# Patient Record
Sex: MALE | Race: WHITE | NOT HISPANIC OR LATINO | Employment: FULL TIME | URBAN - METROPOLITAN AREA
[De-identification: names, ages, dates, MRNs, and addresses within clinical notes are randomized per-mention and may not be internally consistent; named-entity substitution may affect disease eponyms.]

---

## 2020-05-22 ENCOUNTER — LAB VISIT (OUTPATIENT)
Dept: PRIMARY CARE CLINIC | Facility: CLINIC | Age: 70
End: 2020-05-22
Payer: MEDICARE

## 2020-05-22 DIAGNOSIS — R05.9 COUGH: Primary | ICD-10-CM

## 2020-05-22 PROCEDURE — U0003 INFECTIOUS AGENT DETECTION BY NUCLEIC ACID (DNA OR RNA); SEVERE ACUTE RESPIRATORY SYNDROME CORONAVIRUS 2 (SARS-COV-2) (CORONAVIRUS DISEASE [COVID-19]), AMPLIFIED PROBE TECHNIQUE, MAKING USE OF HIGH THROUGHPUT TECHNOLOGIES AS DESCRIBED BY CMS-2020-01-R: HCPCS

## 2020-05-24 LAB — SARS-COV-2 RNA RESP QL NAA+PROBE: NOT DETECTED

## 2020-11-10 ENCOUNTER — LAB VISIT (OUTPATIENT)
Dept: PRIMARY CARE CLINIC | Facility: OTHER | Age: 70
End: 2020-11-10
Attending: INTERNAL MEDICINE
Payer: MEDICARE

## 2020-11-10 DIAGNOSIS — Z03.818 ENCOUNTER FOR OBSERVATION FOR SUSPECTED EXPOSURE TO OTHER BIOLOGICAL AGENTS RULED OUT: ICD-10-CM

## 2020-11-10 PROCEDURE — U0003 INFECTIOUS AGENT DETECTION BY NUCLEIC ACID (DNA OR RNA); SEVERE ACUTE RESPIRATORY SYNDROME CORONAVIRUS 2 (SARS-COV-2) (CORONAVIRUS DISEASE [COVID-19]), AMPLIFIED PROBE TECHNIQUE, MAKING USE OF HIGH THROUGHPUT TECHNOLOGIES AS DESCRIBED BY CMS-2020-01-R: HCPCS

## 2020-11-11 LAB — SARS-COV-2 RNA RESP QL NAA+PROBE: NOT DETECTED

## 2022-12-16 ENCOUNTER — ANESTHESIA EVENT (OUTPATIENT)
Dept: SURGERY | Facility: OTHER | Age: 72
End: 2022-12-16
Payer: MEDICARE

## 2022-12-22 ENCOUNTER — HOSPITAL ENCOUNTER (OUTPATIENT)
Dept: PREADMISSION TESTING | Facility: OTHER | Age: 72
Discharge: HOME | End: 2022-12-22
Attending: ORTHOPAEDIC SURGERY
Payer: MEDICARE

## 2022-12-22 VITALS
BODY MASS INDEX: 31.5 KG/M2 | RESPIRATION RATE: 16 BRPM | HEART RATE: 62 BPM | OXYGEN SATURATION: 98 % | DIASTOLIC BLOOD PRESSURE: 83 MMHG | TEMPERATURE: 97 F | SYSTOLIC BLOOD PRESSURE: 149 MMHG | HEIGHT: 71 IN | WEIGHT: 225 LBS

## 2022-12-22 DIAGNOSIS — Z01.818 PREOP TESTING: Primary | ICD-10-CM

## 2022-12-22 LAB
BASOPHILS # BLD AUTO: 0.04 K/UL (ref 0–0.2)
BASOPHILS NFR BLD: 0.6 % (ref 0–1.9)
DIFFERENTIAL METHOD: ABNORMAL
EOSINOPHIL # BLD AUTO: 0.3 K/UL (ref 0–0.5)
EOSINOPHIL NFR BLD: 4.4 % (ref 0–8)
ERYTHROCYTE [DISTWIDTH] IN BLOOD BY AUTOMATED COUNT: 13.2 % (ref 11.5–14.5)
HCT VFR BLD AUTO: 42.6 % (ref 40–54)
HGB BLD-MCNC: 14.1 G/DL (ref 14–18)
IMM GRANULOCYTES # BLD AUTO: 0.01 K/UL (ref 0–0.04)
IMM GRANULOCYTES NFR BLD AUTO: 0.1 % (ref 0–0.5)
LYMPHOCYTES # BLD AUTO: 2.2 K/UL (ref 1–4.8)
LYMPHOCYTES NFR BLD: 31.6 % (ref 18–48)
MCH RBC QN AUTO: 31.9 PG (ref 27–31)
MCHC RBC AUTO-ENTMCNC: 33.1 G/DL (ref 32–36)
MCV RBC AUTO: 96 FL (ref 82–98)
MONOCYTES # BLD AUTO: 0.7 K/UL (ref 0.3–1)
MONOCYTES NFR BLD: 9.5 % (ref 4–15)
NEUTROPHILS # BLD AUTO: 3.8 K/UL (ref 1.8–7.7)
NEUTROPHILS NFR BLD: 53.8 % (ref 38–73)
NRBC BLD-RTO: 0 /100 WBC
PLATELET # BLD AUTO: 220 K/UL (ref 150–450)
PMV BLD AUTO: 10.1 FL (ref 9.2–12.9)
RBC # BLD AUTO: 4.42 M/UL (ref 4.6–6.2)
WBC # BLD AUTO: 7.02 K/UL (ref 3.9–12.7)

## 2022-12-22 PROCEDURE — 93005 ELECTROCARDIOGRAM TRACING: CPT

## 2022-12-22 PROCEDURE — 36415 COLL VENOUS BLD VENIPUNCTURE: CPT | Performed by: ANESTHESIOLOGY

## 2022-12-22 PROCEDURE — 93010 ELECTROCARDIOGRAM REPORT: CPT | Mod: ,,, | Performed by: INTERNAL MEDICINE

## 2022-12-22 PROCEDURE — 93010 EKG 12-LEAD: ICD-10-PCS | Mod: ,,, | Performed by: INTERNAL MEDICINE

## 2022-12-22 PROCEDURE — 85025 COMPLETE CBC W/AUTO DIFF WBC: CPT | Performed by: ANESTHESIOLOGY

## 2022-12-22 RX ORDER — AMLODIPINE BESYLATE 2.5 MG/1
2.5 TABLET ORAL DAILY
COMMUNITY

## 2022-12-22 RX ORDER — LIDOCAINE HYDROCHLORIDE 10 MG/ML
0.5 INJECTION, SOLUTION EPIDURAL; INFILTRATION; INTRACAUDAL; PERINEURAL ONCE
Status: CANCELLED | OUTPATIENT
Start: 2022-12-22 | End: 2022-12-22

## 2022-12-22 RX ORDER — PREDNISONE 2.5 MG/1
2.5 TABLET ORAL DAILY
COMMUNITY

## 2022-12-22 RX ORDER — ROPINIROLE 0.5 MG/1
0.5 TABLET, FILM COATED ORAL 3 TIMES DAILY PRN
COMMUNITY

## 2022-12-22 RX ORDER — ATORVASTATIN CALCIUM 80 MG/1
80 TABLET, FILM COATED ORAL DAILY
COMMUNITY

## 2022-12-22 RX ORDER — ASPIRIN 81 MG/1
81 TABLET ORAL DAILY
COMMUNITY

## 2022-12-22 RX ORDER — SODIUM CHLORIDE, SODIUM LACTATE, POTASSIUM CHLORIDE, CALCIUM CHLORIDE 600; 310; 30; 20 MG/100ML; MG/100ML; MG/100ML; MG/100ML
INJECTION, SOLUTION INTRAVENOUS CONTINUOUS
Status: CANCELLED | OUTPATIENT
Start: 2022-12-22

## 2022-12-22 RX ORDER — METOPROLOL SUCCINATE 25 MG/1
25 TABLET, EXTENDED RELEASE ORAL DAILY
COMMUNITY

## 2022-12-22 RX ORDER — ALLOPURINOL 300 MG/1
300 TABLET ORAL DAILY
COMMUNITY

## 2022-12-22 RX ORDER — SERTRALINE HYDROCHLORIDE 50 MG/1
50 TABLET, FILM COATED ORAL DAILY
COMMUNITY

## 2022-12-22 NOTE — DISCHARGE INSTRUCTIONS
Information to Prepare you for your Surgery    PRE-ADMIT TESTING -  [phone]    [address] [facility]        Your surgery has been scheduled at [facility]. We are pleased to have the opportunity to serve you. For Further Information please call [phone].    On the day of surgery please report to the Information Desk on the 1st floor.    CONTACT YOUR PHYSICIAN'S OFFICE THE DAY PRIOR TO YOUR SURGERY TO OBTAIN YOUR ARRIVAL TIME.     The evening before surgery do not eat anything after 9 p.m. ( this includes hard candy, chewing gum and mints).  You may only have GATORADE, POWERADE AND WATER  from 9 p.m. until you leave your home.   DO NOT DRINK ANY LIQUIDS ON THE WAY TO THE HOSPITAL.      Why does your anesthesiologist allow you to drink Gatorade/Powerade before surgery?  Gatorade/Powerade helps to increase your comfort before surgery and to decrease your nausea after surgery.   The carbohydrates in Gatorade/Powerade help reduce your body's stress response to surgery.  If you are a diabetic-drink only water prior to surgery.    Current Visitor policy(12/27/2021) - Patients may have 2 visitors pre and post procedure. Only 2 visitors will be allowed in the Surgical building with the patient.     SPECIAL MEDICATION INSTRUCTIONS: TAKE medications checked off by the Anesthesiologist on your Medication List.    Surgery Patients:  If you take ASPIRIN - Your PHYSICIAN/SURGEON will need to inform you IF/OR when you need to stop taking aspirin prior to your surgery.     The week prior to surgery do not ot take any medications containing IBUPROFEN or NSAIDS ( Advil, Motrin, Goodys, BC, Aleve, Naproxen etc) If you are not sure if you should take a medicine please call your surgeon's office.  You may take Tylenol.    Do Not Wear any make-up (especially eye make-up) to surgery. Please remove any false eyelashes or eyelash extensions. If you arrive the day of surgery with makeup/eyelashes  on you will be required to remove prior to surgery. (There is a risk of corneal abrasions if eye makeup/eyelash extensions are not removed)    Leave all valuables at home.   Do Not wear any jewelry or watches, including any metal in body piercings. Jewelry must be removed prior to coming to the hospital.  There is a possibility that rings that are unable to be removed may be cut off if they are on the surgical extremity.    Please remove all hair extensions, wigs, clips and any other metal accessories/ ornaments from your hair.  These items may pose a flammable/fire risk in Surgery and must be removed.    Do not shave your surgical area at least 5 days prior to your surgery. The surgical prep will be performed at the hospital according to Infection Control regulations.    Contact Lens must be removed before surgery. Either do not wear the contact lens or bring a case and solution for storage.  Please bring a container for eyeglasses or dentures as required.  Bring any paperwork your physician has provided, such as consent forms,  history and physicals, doctor's orders, etc.   Bring comfortable clothes that are loose fitting to wear upon discharge. Take into consideration the type of surgery being performed.  Maintain your diet as advised per your physician the day prior to surgery.    Adequate rest the night before surgery is advised.   Park in the Parking lot behind the hospital or in the [place] [facility] across the street from the parking lot. Parking is complimentary.  If you will be discharged the same day as your procedure, please arrange for a responsible adult to drive you home or to accompany you if traveling by taxi.   YOU WILL NOT BE PERMITTED TO DRIVE OR TO LEAVE THE HOSPITAL ALONE AFTER SURGERY.   If you are being discharged the same day, it is strongly recommended that you arrange for someone to remain with you for the first 24 hrs following your surgery.    The Surgeon will speak to your  family/visitor after your surgery regarding the outcome of your surgery and post op care.  The Surgeon may speak to you after your surgery, but there is a possibility you may not remember the details.  Please check with your family members regarding the conversation with the Surgeon.    We strongly recommend whoever is bringing you home be present for discharge instructions.  This will ensure a thorough understanding for your post op home care.    ALL CHILDREN MUST ALWAYS BE ACCOMPANIED BY AN ADULT.    Visitors-Refer to current Visitor policy handouts.    Thank you for your cooperation.  The Staff of [facility].            Bathing Instructions with Hibiclens  Shower the evening before and morning of your procedure with Chlorhexidine (Hibiclens)  do not use Chlorhexidine on your face or genitals. Do not get in your eyes.  Wash your face with water and your regular face wash/soap  Use your regular shampoo  Apply Chlorhexidine (Hibiclens) directly on your skin or on a wet washcloth and wash gently. When showering: Move away from the shower stream when applying Chlorhexidine (Hibiclens) to avoid rinsing off too soon.  Rinse thoroughly with warm water  Do not dilute Chlorhexidine (Hibiclens)   Dry off as usual, do not use any deodorant, powder, body lotions, perfume, after shave or cologne.

## 2022-12-22 NOTE — ANESTHESIA PREPROCEDURE EVALUATION
12/22/2022  Nagi Sharma is a 72 y.o., male.      Pre-op Assessment    I have reviewed the Patient Summary Reports.     I have reviewed the Nursing Notes.    I have reviewed the Medications.   Steroids Taken In Past Year: Prednisone    Review of Systems  Anesthesia Hx:  No problems with previous Anesthesia  History of prior surgery of interest to airway management or planning: Denies Family Hx of Anesthesia complications.   Denies Personal Hx of Anesthesia complications.   Social:  Former Smoker    Hematology/Oncology:  Hematology Normal   Oncology Normal     EENT/Dental:EENT/Dental Normal   Cardiovascular:   Hypertension CAD asymptomatic     Pulmonary:  Pulmonary Normal    Renal/:  Renal/ Normal     Hepatic/GI:  Hepatic/GI Normal    Musculoskeletal:   Arthritis  Polymyalgia Rheumatica   Neurological:  Neurology Normal    Endocrine:  Endocrine Normal    Dermatological:  Skin Normal    Psych:  Psychiatric Normal           Physical Exam  General: Cooperative, Alert, Well nourished and Oriented    Airway:  Mallampati: II   Mouth Opening: Normal  Neck ROM: Normal ROM    Dental:  Intact        Anesthesia Plan  Type of Anesthesia, risks & benefits discussed:    Anesthesia Type: Gen Supraglottic Airway  Intra-op Monitoring Plan: Standard ASA Monitors  Post Op Pain Control Plan: multimodal analgesia  Induction:  IV  Informed Consent: Informed consent signed with the Patient and all parties understand the risks and agree with anesthesia plan.  All questions answered.   ASA Score: 2    Ready For Surgery From Anesthesia Perspective.     .

## 2022-12-30 ENCOUNTER — ANESTHESIA (OUTPATIENT)
Dept: SURGERY | Facility: OTHER | Age: 72
End: 2022-12-30
Payer: MEDICARE

## 2022-12-30 ENCOUNTER — HOSPITAL ENCOUNTER (OUTPATIENT)
Facility: OTHER | Age: 72
Discharge: HOME | End: 2022-12-30
Attending: ORTHOPAEDIC SURGERY | Admitting: ORTHOPAEDIC SURGERY
Payer: MEDICARE

## 2022-12-30 DIAGNOSIS — M18.12 OSTEOARTHRITIS OF THUMB, LEFT: Primary | ICD-10-CM

## 2022-12-30 PROCEDURE — 36000709 HC OR TIME LEV III EA ADD 15 MIN: Performed by: ORTHOPAEDIC SURGERY

## 2022-12-30 PROCEDURE — 63600175 PHARM REV CODE 636 W HCPCS: Performed by: ANESTHESIOLOGY

## 2022-12-30 PROCEDURE — 71000015 HC POSTOP RECOV 1ST HR: Performed by: ORTHOPAEDIC SURGERY

## 2022-12-30 PROCEDURE — 37000009 HC ANESTHESIA EA ADD 15 MINS: Performed by: ORTHOPAEDIC SURGERY

## 2022-12-30 PROCEDURE — 76942 ECHO GUIDE FOR BIOPSY: CPT | Performed by: ANESTHESIOLOGY

## 2022-12-30 PROCEDURE — 63600175 PHARM REV CODE 636 W HCPCS: Performed by: STUDENT IN AN ORGANIZED HEALTH CARE EDUCATION/TRAINING PROGRAM

## 2022-12-30 PROCEDURE — 36000708 HC OR TIME LEV III 1ST 15 MIN: Performed by: ORTHOPAEDIC SURGERY

## 2022-12-30 PROCEDURE — 63600175 PHARM REV CODE 636 W HCPCS: Performed by: NURSE ANESTHETIST, CERTIFIED REGISTERED

## 2022-12-30 PROCEDURE — 71000016 HC POSTOP RECOV ADDL HR: Performed by: ORTHOPAEDIC SURGERY

## 2022-12-30 PROCEDURE — C1713 ANCHOR/SCREW BN/BN,TIS/BN: HCPCS | Performed by: ORTHOPAEDIC SURGERY

## 2022-12-30 PROCEDURE — 25000003 PHARM REV CODE 250: Performed by: STUDENT IN AN ORGANIZED HEALTH CARE EDUCATION/TRAINING PROGRAM

## 2022-12-30 PROCEDURE — 37000008 HC ANESTHESIA 1ST 15 MINUTES: Performed by: ORTHOPAEDIC SURGERY

## 2022-12-30 DEVICE — SYS CMC LIG RECON IMPLANT: Type: IMPLANTABLE DEVICE | Site: HAND | Status: FUNCTIONAL

## 2022-12-30 RX ORDER — PROCHLORPERAZINE EDISYLATE 5 MG/ML
5 INJECTION INTRAMUSCULAR; INTRAVENOUS EVERY 30 MIN PRN
Status: DISCONTINUED | OUTPATIENT
Start: 2022-12-30 | End: 2022-12-30 | Stop reason: HOSPADM

## 2022-12-30 RX ORDER — OXYCODONE HYDROCHLORIDE 5 MG/1
5 TABLET ORAL
Status: DISCONTINUED | OUTPATIENT
Start: 2022-12-30 | End: 2022-12-30 | Stop reason: HOSPADM

## 2022-12-30 RX ORDER — LIDOCAINE HYDROCHLORIDE 20 MG/ML
INJECTION INTRAVENOUS
Status: DISCONTINUED | OUTPATIENT
Start: 2022-12-30 | End: 2022-12-30

## 2022-12-30 RX ORDER — MIDAZOLAM HYDROCHLORIDE 1 MG/ML
INJECTION INTRAMUSCULAR; INTRAVENOUS
Status: DISCONTINUED | OUTPATIENT
Start: 2022-12-30 | End: 2022-12-30

## 2022-12-30 RX ORDER — FENTANYL CITRATE 50 UG/ML
INJECTION, SOLUTION INTRAMUSCULAR; INTRAVENOUS
Status: DISCONTINUED | OUTPATIENT
Start: 2022-12-30 | End: 2022-12-30

## 2022-12-30 RX ORDER — CEFAZOLIN SODIUM 1 G/3ML
INJECTION, POWDER, FOR SOLUTION INTRAMUSCULAR; INTRAVENOUS
Status: DISCONTINUED | OUTPATIENT
Start: 2022-12-30 | End: 2022-12-30

## 2022-12-30 RX ORDER — CEFAZOLIN SODIUM 1 G/3ML
2 INJECTION, POWDER, FOR SOLUTION INTRAMUSCULAR; INTRAVENOUS
Status: CANCELLED | OUTPATIENT
Start: 2022-12-30 | End: 2022-12-30

## 2022-12-30 RX ORDER — PROPOFOL 10 MG/ML
VIAL (ML) INTRAVENOUS
Status: DISCONTINUED | OUTPATIENT
Start: 2022-12-30 | End: 2022-12-30

## 2022-12-30 RX ORDER — DIPHENHYDRAMINE HYDROCHLORIDE 50 MG/ML
25 INJECTION INTRAMUSCULAR; INTRAVENOUS EVERY 6 HOURS PRN
Status: DISCONTINUED | OUTPATIENT
Start: 2022-12-30 | End: 2022-12-30 | Stop reason: HOSPADM

## 2022-12-30 RX ORDER — PROPOFOL 10 MG/ML
VIAL (ML) INTRAVENOUS CONTINUOUS PRN
Status: DISCONTINUED | OUTPATIENT
Start: 2022-12-30 | End: 2022-12-30

## 2022-12-30 RX ORDER — ROPIVACAINE HYDROCHLORIDE 5 MG/ML
INJECTION, SOLUTION EPIDURAL; INFILTRATION; PERINEURAL
Status: COMPLETED | OUTPATIENT
Start: 2022-12-30 | End: 2022-12-30

## 2022-12-30 RX ORDER — SODIUM CHLORIDE 0.9 % (FLUSH) 0.9 %
3 SYRINGE (ML) INJECTION
Status: DISCONTINUED | OUTPATIENT
Start: 2022-12-30 | End: 2022-12-30 | Stop reason: HOSPADM

## 2022-12-30 RX ORDER — LIDOCAINE HYDROCHLORIDE 10 MG/ML
0.5 INJECTION, SOLUTION EPIDURAL; INFILTRATION; INTRACAUDAL; PERINEURAL ONCE
Status: DISCONTINUED | OUTPATIENT
Start: 2022-12-30 | End: 2022-12-30 | Stop reason: HOSPADM

## 2022-12-30 RX ORDER — SODIUM CHLORIDE, SODIUM LACTATE, POTASSIUM CHLORIDE, CALCIUM CHLORIDE 600; 310; 30; 20 MG/100ML; MG/100ML; MG/100ML; MG/100ML
INJECTION, SOLUTION INTRAVENOUS CONTINUOUS
Status: DISCONTINUED | OUTPATIENT
Start: 2022-12-30 | End: 2022-12-30 | Stop reason: HOSPADM

## 2022-12-30 RX ORDER — EPHEDRINE SULFATE 50 MG/ML
INJECTION, SOLUTION INTRAVENOUS
Status: DISCONTINUED | OUTPATIENT
Start: 2022-12-30 | End: 2022-12-30

## 2022-12-30 RX ORDER — MEPERIDINE HYDROCHLORIDE 25 MG/ML
12.5 INJECTION INTRAMUSCULAR; INTRAVENOUS; SUBCUTANEOUS ONCE AS NEEDED
Status: DISCONTINUED | OUTPATIENT
Start: 2022-12-30 | End: 2022-12-30 | Stop reason: HOSPADM

## 2022-12-30 RX ORDER — HYDROCODONE BITARTRATE AND ACETAMINOPHEN 5; 325 MG/1; MG/1
1 TABLET ORAL EVERY 4 HOURS PRN
Qty: 30 TABLET | Refills: 0 | Status: SHIPPED | OUTPATIENT
Start: 2022-12-30

## 2022-12-30 RX ORDER — HYDROMORPHONE HYDROCHLORIDE 2 MG/ML
0.4 INJECTION, SOLUTION INTRAMUSCULAR; INTRAVENOUS; SUBCUTANEOUS EVERY 5 MIN PRN
Status: DISCONTINUED | OUTPATIENT
Start: 2022-12-30 | End: 2022-12-30 | Stop reason: HOSPADM

## 2022-12-30 RX ADMIN — MIDAZOLAM HYDROCHLORIDE 1 MG: 1 INJECTION, SOLUTION INTRAMUSCULAR; INTRAVENOUS at 06:12

## 2022-12-30 RX ADMIN — SODIUM CHLORIDE, SODIUM LACTATE, POTASSIUM CHLORIDE, AND CALCIUM CHLORIDE: .6; .31; .03; .02 INJECTION, SOLUTION INTRAVENOUS at 06:12

## 2022-12-30 RX ADMIN — GLYCOPYRROLATE 0.2 MG: 0.2 INJECTION, SOLUTION INTRAMUSCULAR; INTRAVITREAL at 06:12

## 2022-12-30 RX ADMIN — CEFAZOLIN 2 G: 330 INJECTION, POWDER, FOR SOLUTION INTRAMUSCULAR; INTRAVENOUS at 07:12

## 2022-12-30 RX ADMIN — ROPIVACAINE HYDROCHLORIDE 20 ML: 5 INJECTION, SOLUTION EPIDURAL; INFILTRATION; PERINEURAL at 06:12

## 2022-12-30 RX ADMIN — PROPOFOL 70 MG: 10 INJECTION, EMULSION INTRAVENOUS at 07:12

## 2022-12-30 RX ADMIN — EPHEDRINE SULFATE 5 MG: 50 INJECTION INTRAVENOUS at 08:12

## 2022-12-30 RX ADMIN — PROPOFOL 125 MCG/KG/MIN: 10 INJECTION, EMULSION INTRAVENOUS at 07:12

## 2022-12-30 RX ADMIN — EPHEDRINE SULFATE 5 MG: 50 INJECTION INTRAVENOUS at 07:12

## 2022-12-30 RX ADMIN — FENTANYL CITRATE 50 MCG: 50 INJECTION, SOLUTION INTRAMUSCULAR; INTRAVENOUS at 06:12

## 2022-12-30 RX ADMIN — LIDOCAINE HYDROCHLORIDE 60 MG: 20 INJECTION, SOLUTION INTRAVENOUS at 07:12

## 2022-12-30 NOTE — ANESTHESIA POSTPROCEDURE EVALUATION
Anesthesia Post Evaluation    Patient: Nagi Sharma    Procedure(s) Performed: Procedure(s) (LRB):  INTERPOSITION ARTHROPLASTY, CMC JOINT / CARPOMETACARPAL ( CMC) (Left)    Final Anesthesia Type: regional      Patient location during evaluation: [facility]  Patient participation: Yes- Able to Participate  Level of consciousness: awake and alert  Post-procedure vital signs: reviewed and stable  Pain management: adequate  Airway patency: patent  TALI mitigation strategies: Multimodal analgesia  PONV status at discharge: No PONV  Anesthetic complications: no      Cardiovascular status: blood pressure returned to baseline, hemodynamically stable and stable  Respiratory status: unassisted, spontaneous ventilation and room air  Hydration status: euvolemic  Follow-up not needed.          Vitals Value Taken Time   BP  12/30/22 0819   Temp  12/30/22 0819   Pulse  12/30/22 0819   Resp  12/30/22 0819   SpO2  12/30/22 0819         No case tracking events are documented in the log.      Pain/Mariann Score: No data recorded

## 2022-12-30 NOTE — OR NURSING
Pt reported he last took his aspirin 81 mg on 12/29/22 at 0500. Dr Mar notified and Dr Kothari notified. No new orders and no changes to plan of care for surgery this morning.

## 2022-12-30 NOTE — DISCHARGE INSTRUCTIONS
Anesthesia: Monitored Anesthesia Care (MAC)    Anesthesia Safety  Have an adult family member or friend drive you home after the procedure.  For the first 24 hours after your surgery:  Do not drive or use heavy equipment.  Do not make important decisions or sign documents.  Avoid alcohol.  Have someone stay with you, if possible. They can watch for problems and help keep you safe.         FOLLOW ANY INSTRUCTIONS GIVEN BY DR CHAPMAN

## 2022-12-30 NOTE — ANESTHESIA PROCEDURE NOTES
Peripheral Block    Patient location during procedure: holding area    Reason for block: primary anesthetic    Diagnosis: wrist surgery   Timeout: 12/30/2022 6:48 AM     Staffing  Authorizing Provider: Octavio Aceves MD  Performing Provider: Octavio Aceves MD    Preanesthetic Checklist  Completed: patient identified, IV checked, site marked, risks and benefits discussed, surgical consent, monitors and equipment checked, pre-op evaluation and timeout performed  Peripheral Block  Patient position: supine  Prep: ChloraPrep  Patient monitoring: heart rate, cardiac monitor, continuous pulse ox and frequent blood pressure checks  Block type: supraclavicular  Laterality: left  Injection technique: single shot  Needle  Needle type: Stimuplex   Needle gauge: 20 G  Needle length: 4 in  Needle localization: nerve stimulator and ultrasound guidance   -ultrasound image captured on disc.  Assessment  Injection assessment: negative parasthesia, negative aspiration and local visualized surrounding nerve  Paresthesia pain: none  Heart rate change: no  Slow fractionated injection: yes  Pain Tolerance: comfortable throughout block and no complaints  Medications:    Medications: ropivacaine (NAROPIN) injection 0.5% - Perineural   20 mL - 12/30/2022 6:52:00 AM

## 2022-12-30 NOTE — PLAN OF CARE
Nagi Sharma has met all discharge criteria from Phase II. Vital Signs are stable, ambulating  without difficulty. Discharge instructions given, patient verbalized understanding. Discharged from facility via wheelchair in stable condition.

## 2022-12-30 NOTE — BRIEF OP NOTE
[facility] ([facility]   Operative Note     SUMMARY     Surgery Date: 12/30/2022     Surgeon(s) and Role:     * Claude S. Williams IV, MD - Primary    Assisting Surgeon: None    Pre-op Diagnosis:  Osteoarthritis of thumb, left [M18.12]    Post-op Diagnosis:  Post-Op Diagnosis Codes:     * Osteoarthritis of thumb, left [M18.12]    Procedure(s) (LRB):  INTERPOSITION ARTHROPLASTY, CMC JOINT / CARPOMETACARPAL ( CMC) (Left)    Anesthesia: General    Description of the findings of the procedure: as above    Findings/Key Components: as above    Surgery Date: 12/30/2022  Patient Name: Nagi Sharma  CSN: [id number]  Surgeon(s) and Role:    Claude S. Williams IV, MD - Primary    Pre-op Diagnosis:  Osteoarthritis of thumb, left [M18.12]    Post-op Diagnosis:  Osteoarthritis of thumb, left [M18.12]    Procedure(s) (LRB):  INTERPOSITION ARTHROPLASTY, CMC JOINT / CARPOMETACARPAL ( CMC) (Left)        Procedure in detail:  After proper informed consent was obtained and questions were answered the operative site was marked and the patient was transported to the operating suite and placed supine on the operating table.  General endotracheal anesthesia was administered per the anesthesiologist without difficulty.  All bony prominences were well padded and the upper extremity was placed into a well-padded upper arm tourniquet and onto a hand table. The upper extremity was then thoroughly prepped with alcohol ChloraPrep and draped in usual sterile fashion.  Preoperative antibiotics were administered.  Routine preoperative time-out was then taken and the operative site was positively identified by the operative team.  After Esmarch examination the upper extremity tourniquet was elevated to 250 mm of mercury.  A longitudinal incision was then made over the base of the thumb and careful dissection was carried down through the subdermal tissue using bipolar cautery for hemostasis.  Care was taken to identify and protect the cutaneous  nerve branches which were carefully retracted during the procedure. The extensor tendons were retracted and a longitudinal incision was made in the trapezial metacarpal joint capsule.  The capsular flaps were elevated from the trapezium and subperiosteal dissection of the trapezium was carried out. The trapezium was divided and then removed in piecemeal fashion. Care was taken to protect the vascular structures and flexor carpi radialis distal insertion.  The image intensifier was then used to visualize complete excision of the trapezium.  An incision was then made in the forearm and the flexor carpi radialis tendon was dissected and divided and then withdrawn into the distal wound. The FCR was traced to its distal insertion.  A guide pin was then advanced from the base of the 1st metacarpal radial cortex retrograde to the articular surface. A 4 mm cannulated drill was then advanced over the guidewire.  The flexor carpi radialis graft was then passed through the base of the thumb metacarpal and with the metacarpal appropriately positioned and absorbable interference screw was advanced in the bone tunnel to secure the FCR graft.  The remainder of the graft was then placed within the joint space as an anchovy spacer.  The tourniquet was deflated and meticulous hemostasis was confirmed. The capsule was then closed with figure-of-eight interrupted Vicryl suture. The image intensifier again was used to visualize complete excision of the trapezium and appropriate position of the thumb metacarpal, stability and placement of the bone tunnel.  The skin incisions were then closed with Monocryl and nylon skin suture. There was normal circulation throughout the hand after deflation of the tourniquet. A sterile soft dressing was applied and a thumb spica splint was placed.  The patient was awakened in the operating suite and taken to the recovery area in stable condition. She tolerated the procedure very well. Catie gracia and  needle counts were correct x2.  Complications none.     Estimated Blood Loss: * No values recorded between 12/30/2022  7:20 AM and 12/30/2022  8:19 AM *         Specimens:   Specimen (24h ago, onward)      None            Discharge Note    SUMMARY     Admit Date: 12/30/2022    Discharge Date and Time:  12/30/2022 8:29 AM    Hospital Course (synopsis of major diagnoses, care, treatment, and services provided during the course of the hospital stay): [email or web address]     Final Diagnosis: Post-Op Diagnosis Codes:     * Osteoarthritis of thumb, left [M18.12]    Disposition: Home or Self Care    Follow Up/Patient Instructions:     Medications:  Reconciled Home Medications:      Medication List        START taking these medications      HYDROcodone-acetaminophen 5-325 mg per tablet  Commonly known as: NORCO  Take 1 tablet by mouth every 4 (four) hours as needed for Pain.            CONTINUE taking these medications      allopurinoL 300 MG tablet  Commonly known as: ZYLOPRIM  Take 300 mg by mouth once daily.     amLODIPine 2.5 MG tablet  Commonly known as: NORVASC  Take 2.5 mg by mouth once daily.     aspirin 81 MG EC tablet  Commonly known as: ECOTRIN  Take 81 mg by mouth once daily.     atorvastatin 80 MG tablet  Commonly known as: LIPITOR  Take 80 mg by mouth once daily.     COLCHICINE ORAL  Take by mouth as needed.     ezetimibe 10 mg tablet  Commonly known as: ZETIA  TAKE 1 TABLET EVERY DAY     metoprolol succinate 25 MG 24 hr tablet  Commonly known as: TOPROL-XL  Take 25 mg by mouth once daily.     predniSONE 2.5 MG tablet  Commonly known as: DELTASONE  Take 2.5 mg by mouth once daily.     rOPINIRole 0.5 MG tablet  Commonly known as: REQUIP  Take 0.5 mg by mouth 3 (three) times daily as needed.     sertraline 50 MG tablet  Commonly known as: ZOLOFT  Take 50 mg by mouth once daily.            Discharge Procedure Orders   Diet general     Leave dressing on - Keep it clean, dry, and intact until clinic visit     Call MD for:   temperature >100.4     Call MD for:  persistent nausea and vomiting     Call MD for:  severe uncontrolled pain     Call MD for:  difficulty breathing, headache or visual disturbances     Call MD for:  redness, tenderness, or signs of infection (pain, swelling, redness, odor or green/yellow discharge around incision site)     Call MD for:  hives     Call MD for:  persistent dizziness or light-headedness     Call MD for:  extreme fatigue     Keep surgical extremity elevated     Lifting restrictions     No driving, operating heavy equipment or signing legal documents while taking pain medication      Follow-up Information       Claude S. Williams Iv, MD Follow up in 1 week(s).    Specialty: Orthopedic Surgery  Why: For wound re-check, For suture removal  Contact information:  [address]  [facility] [address] [phone]

## 2023-01-02 VITALS
HEART RATE: 61 BPM | RESPIRATION RATE: 16 BRPM | BODY MASS INDEX: 31.5 KG/M2 | TEMPERATURE: 98 F | HEIGHT: 71 IN | WEIGHT: 225 LBS | SYSTOLIC BLOOD PRESSURE: 120 MMHG | DIASTOLIC BLOOD PRESSURE: 71 MMHG | OXYGEN SATURATION: 96 %

## 2023-03-09 ENCOUNTER — ANESTHESIA EVENT (OUTPATIENT)
Dept: SURGERY | Facility: OTHER | Age: 73
End: 2023-03-09
Payer: MEDICARE

## 2023-03-15 RX ORDER — CELECOXIB 200 MG/1
200 CAPSULE ORAL
COMMUNITY

## 2023-03-15 NOTE — PRE-PROCEDURE INSTRUCTIONS
Pre admit phone call completed.    Instructions given to patient about NPO status as follows:     The evening before surgery do not eat anything after 9 p.m. ( this includes hard candy, chewing gum and mints).  You may only have GATORADE, POWERADE AND WATER from 9 p.m. until you leave your home. DO NOT  DRINK ANY LIQUIDS ON THE WAY TO THE HOSPITAL.      Patient was also instructed on the below information:    Park in the Parking lot behind the hospital or in the [employer] [facility] across the street from the parking lot.  Parking is complimentary.  If you will be discharged the same day as your procedure, please arrange for a responsible adult to drive you home or  to accompany you if traveling by taxi.  YOU WILL NOT BE PERMITTED TO DRIVE OR TO LEAVE THE HOSPITAL ALONE AFTER SURGERY.  It is strongly recommended that you arrange for someone to remain with you for the first 24 hrs following your surgery.    Patient verbalized understanding of above instructions.   
none

## 2023-03-17 ENCOUNTER — ANESTHESIA (OUTPATIENT)
Dept: SURGERY | Facility: OTHER | Age: 73
End: 2023-03-17
Payer: MEDICARE

## 2023-03-17 ENCOUNTER — HOSPITAL ENCOUNTER (OUTPATIENT)
Facility: OTHER | Age: 73
Discharge: HOME | End: 2023-03-17
Attending: ORTHOPAEDIC SURGERY | Admitting: ORTHOPAEDIC SURGERY
Payer: MEDICARE

## 2023-03-17 DIAGNOSIS — M75.121 COMPLETE ROTATOR CUFF TEAR OR RUPTURE OF RIGHT SHOULDER, NOT SPECIFIED AS TRAUMATIC: ICD-10-CM

## 2023-03-17 DIAGNOSIS — M75.121 COMPLETE TEAR OF RIGHT ROTATOR CUFF, UNSPECIFIED WHETHER TRAUMATIC: Primary | ICD-10-CM

## 2023-03-17 PROCEDURE — 71000033 HC RECOVERY, INTIAL HOUR: Performed by: ORTHOPAEDIC SURGERY

## 2023-03-17 PROCEDURE — C1713 ANCHOR/SCREW BN/BN,TIS/BN: HCPCS | Performed by: ORTHOPAEDIC SURGERY

## 2023-03-17 PROCEDURE — 36000710: Performed by: ORTHOPAEDIC SURGERY

## 2023-03-17 PROCEDURE — 64415 NJX AA&/STRD BRCH PLXS IMG: CPT | Mod: 59,RT | Performed by: ANESTHESIOLOGY

## 2023-03-17 PROCEDURE — 63600175 PHARM REV CODE 636 W HCPCS: Performed by: NURSE ANESTHETIST, CERTIFIED REGISTERED

## 2023-03-17 PROCEDURE — 63600175 PHARM REV CODE 636 W HCPCS: Performed by: ORTHOPAEDIC SURGERY

## 2023-03-17 PROCEDURE — 71000015 HC POSTOP RECOV 1ST HR: Performed by: ORTHOPAEDIC SURGERY

## 2023-03-17 PROCEDURE — 25000003 PHARM REV CODE 250: Performed by: NURSE ANESTHETIST, CERTIFIED REGISTERED

## 2023-03-17 PROCEDURE — 71000039 HC RECOVERY, EACH ADD'L HOUR: Performed by: ORTHOPAEDIC SURGERY

## 2023-03-17 PROCEDURE — 37000009 HC ANESTHESIA EA ADD 15 MINS: Performed by: ORTHOPAEDIC SURGERY

## 2023-03-17 PROCEDURE — 27201423 OPTIME MED/SURG SUP & DEVICES STERILE SUPPLY: Performed by: ORTHOPAEDIC SURGERY

## 2023-03-17 PROCEDURE — 37000008 HC ANESTHESIA 1ST 15 MINUTES: Performed by: ORTHOPAEDIC SURGERY

## 2023-03-17 PROCEDURE — 63600175 PHARM REV CODE 636 W HCPCS: Performed by: ANESTHESIOLOGY

## 2023-03-17 PROCEDURE — 25000003 PHARM REV CODE 250: Performed by: ORTHOPAEDIC SURGERY

## 2023-03-17 PROCEDURE — 63600175 PHARM REV CODE 636 W HCPCS

## 2023-03-17 PROCEDURE — 71000016 HC POSTOP RECOV ADDL HR: Performed by: ORTHOPAEDIC SURGERY

## 2023-03-17 PROCEDURE — 36000711: Performed by: ORTHOPAEDIC SURGERY

## 2023-03-17 DEVICE — ANCHOR SUTURE POPLOK 4.5MM: Type: IMPLANTABLE DEVICE | Site: ARM | Status: FUNCTIONAL

## 2023-03-17 DEVICE — ANCHOR Y-KNOT PRO RC 2 2.8MM: Type: IMPLANTABLE DEVICE | Site: ARM | Status: FUNCTIONAL

## 2023-03-17 RX ORDER — LIDOCAINE HCL/PF 100 MG/5ML
SYRINGE (ML) INTRAVENOUS
Status: DISCONTINUED | OUTPATIENT
Start: 2023-03-17 | End: 2023-03-17

## 2023-03-17 RX ORDER — DIPHENHYDRAMINE HYDROCHLORIDE 50 MG/ML
12.5 INJECTION INTRAMUSCULAR; INTRAVENOUS EVERY 30 MIN PRN
Status: DISCONTINUED | OUTPATIENT
Start: 2023-03-17 | End: 2023-03-17 | Stop reason: HOSPADM

## 2023-03-17 RX ORDER — MIDAZOLAM HYDROCHLORIDE 1 MG/ML
INJECTION, SOLUTION INTRAMUSCULAR; INTRAVENOUS
Status: DISCONTINUED | OUTPATIENT
Start: 2023-03-17 | End: 2023-03-17

## 2023-03-17 RX ORDER — LIDOCAINE HYDROCHLORIDE 10 MG/ML
INJECTION, SOLUTION EPIDURAL; INFILTRATION; INTRACAUDAL; PERINEURAL
Status: DISCONTINUED | OUTPATIENT
Start: 2023-03-17 | End: 2023-03-17 | Stop reason: HOSPADM

## 2023-03-17 RX ORDER — DEXAMETHASONE SODIUM PHOSPHATE 4 MG/ML
INJECTION, SOLUTION INTRA-ARTICULAR; INTRALESIONAL; INTRAMUSCULAR; INTRAVENOUS; SOFT TISSUE
Status: DISCONTINUED | OUTPATIENT
Start: 2023-03-17 | End: 2023-03-17

## 2023-03-17 RX ORDER — ROPIVACAINE HYDROCHLORIDE 5 MG/ML
INJECTION, SOLUTION EPIDURAL; INFILTRATION; PERINEURAL
Status: COMPLETED | OUTPATIENT
Start: 2023-03-17 | End: 2023-03-17

## 2023-03-17 RX ORDER — OXYCODONE HYDROCHLORIDE 5 MG/1
5 TABLET ORAL
Status: DISCONTINUED | OUTPATIENT
Start: 2023-03-17 | End: 2023-03-17 | Stop reason: HOSPADM

## 2023-03-17 RX ORDER — SODIUM CHLORIDE 0.9 % (FLUSH) 0.9 %
3 SYRINGE (ML) INJECTION
Status: DISCONTINUED | OUTPATIENT
Start: 2023-03-17 | End: 2023-03-17 | Stop reason: HOSPADM

## 2023-03-17 RX ORDER — CEFAZOLIN SODIUM 2 G/50ML
2 SOLUTION INTRAVENOUS
Status: DISCONTINUED | OUTPATIENT
Start: 2023-03-17 | End: 2023-03-17 | Stop reason: SDUPTHER

## 2023-03-17 RX ORDER — OXYCODONE AND ACETAMINOPHEN 5; 325 MG/1; MG/1
1 TABLET ORAL EVERY 6 HOURS PRN
Qty: 30 TABLET | Refills: 0 | Status: SHIPPED | OUTPATIENT
Start: 2023-03-17

## 2023-03-17 RX ORDER — PROPOFOL 10 MG/ML
VIAL (ML) INTRAVENOUS
Status: DISCONTINUED | OUTPATIENT
Start: 2023-03-17 | End: 2023-03-17

## 2023-03-17 RX ORDER — MEPERIDINE HYDROCHLORIDE 25 MG/ML
12.5 INJECTION INTRAMUSCULAR; INTRAVENOUS; SUBCUTANEOUS ONCE AS NEEDED
Status: DISCONTINUED | OUTPATIENT
Start: 2023-03-17 | End: 2023-03-17 | Stop reason: HOSPADM

## 2023-03-17 RX ORDER — ONDANSETRON HYDROCHLORIDE 2 MG/ML
INJECTION, SOLUTION INTRAMUSCULAR; INTRAVENOUS
Status: DISCONTINUED | OUTPATIENT
Start: 2023-03-17 | End: 2023-03-17

## 2023-03-17 RX ORDER — ROCURONIUM BROMIDE 10 MG/ML
INJECTION, SOLUTION INTRAVENOUS
Status: DISCONTINUED | OUTPATIENT
Start: 2023-03-17 | End: 2023-03-17

## 2023-03-17 RX ORDER — MUPIROCIN 20 MG/G
OINTMENT TOPICAL 2 TIMES DAILY
Status: DISCONTINUED | OUTPATIENT
Start: 2023-03-17 | End: 2023-03-17 | Stop reason: HOSPADM

## 2023-03-17 RX ORDER — CEFAZOLIN SODIUM 1 G/3ML
2 INJECTION, POWDER, FOR SOLUTION INTRAMUSCULAR; INTRAVENOUS
Status: COMPLETED | OUTPATIENT
Start: 2023-03-17 | End: 2023-03-17

## 2023-03-17 RX ORDER — HYDROMORPHONE HYDROCHLORIDE 2 MG/ML
0.4 INJECTION, SOLUTION INTRAMUSCULAR; INTRAVENOUS; SUBCUTANEOUS EVERY 5 MIN PRN
Status: DISCONTINUED | OUTPATIENT
Start: 2023-03-17 | End: 2023-03-17 | Stop reason: HOSPADM

## 2023-03-17 RX ORDER — FENTANYL CITRATE 50 UG/ML
INJECTION, SOLUTION INTRAMUSCULAR; INTRAVENOUS
Status: DISCONTINUED | OUTPATIENT
Start: 2023-03-17 | End: 2023-03-17

## 2023-03-17 RX ORDER — PROCHLORPERAZINE EDISYLATE 5 MG/ML
5 INJECTION INTRAMUSCULAR; INTRAVENOUS EVERY 30 MIN PRN
Status: DISCONTINUED | OUTPATIENT
Start: 2023-03-17 | End: 2023-03-17 | Stop reason: HOSPADM

## 2023-03-17 RX ADMIN — GLYCOPYRROLATE 0.2 MG: 0.2 INJECTION, SOLUTION INTRAMUSCULAR; INTRAVITREAL at 07:03

## 2023-03-17 RX ADMIN — ONDANSETRON 4 MG: 2 INJECTION INTRAMUSCULAR; INTRAVENOUS at 07:03

## 2023-03-17 RX ADMIN — FENTANYL CITRATE 100 MCG: 50 INJECTION, SOLUTION INTRAMUSCULAR; INTRAVENOUS at 06:03

## 2023-03-17 RX ADMIN — MIDAZOLAM HYDROCHLORIDE 2 MG: 1 INJECTION, SOLUTION INTRAMUSCULAR; INTRAVENOUS at 06:03

## 2023-03-17 RX ADMIN — CEFAZOLIN 2 G: 330 INJECTION, POWDER, FOR SOLUTION INTRAMUSCULAR; INTRAVENOUS at 07:03

## 2023-03-17 RX ADMIN — LIDOCAINE HYDROCHLORIDE 75 MG: 20 INJECTION, SOLUTION INTRAVENOUS at 07:03

## 2023-03-17 RX ADMIN — ROPIVACAINE HYDROCHLORIDE 30 ML: 5 INJECTION, SOLUTION EPIDURAL; INFILTRATION; PERINEURAL at 06:03

## 2023-03-17 RX ADMIN — SODIUM CHLORIDE, SODIUM LACTATE, POTASSIUM CHLORIDE, AND CALCIUM CHLORIDE: .6; .31; .03; .02 INJECTION, SOLUTION INTRAVENOUS at 06:03

## 2023-03-17 RX ADMIN — SODIUM CHLORIDE, SODIUM LACTATE, POTASSIUM CHLORIDE, AND CALCIUM CHLORIDE: .6; .31; .03; .02 INJECTION, SOLUTION INTRAVENOUS at 08:03

## 2023-03-17 RX ADMIN — CARBOXYMETHYLCELLULOSE SODIUM 2 DROP: 2.5 SOLUTION/ DROPS OPHTHALMIC at 07:03

## 2023-03-17 RX ADMIN — PROPOFOL 150 MG: 10 INJECTION, EMULSION INTRAVENOUS at 07:03

## 2023-03-17 RX ADMIN — FENTANYL CITRATE 100 MCG: 50 INJECTION, SOLUTION INTRAMUSCULAR; INTRAVENOUS at 07:03

## 2023-03-17 RX ADMIN — DEXAMETHASONE SODIUM PHOSPHATE 8 MG: 4 INJECTION, SOLUTION INTRAMUSCULAR; INTRAVENOUS at 07:03

## 2023-03-17 RX ADMIN — PHENYLEPHRINE HYDROCHLORIDE 0.25 MCG/KG/MIN: 10 INJECTION INTRAVENOUS at 07:03

## 2023-03-17 RX ADMIN — GLYCOPYRROLATE 0.1 MG: 0.2 INJECTION, SOLUTION INTRAMUSCULAR; INTRAVITREAL at 07:03

## 2023-03-17 RX ADMIN — ROCURONIUM BROMIDE 50 MG: 10 INJECTION, SOLUTION INTRAVENOUS at 07:03

## 2023-03-17 RX ADMIN — GLYCOPYRROLATE 0.1 MG: 0.2 INJECTION, SOLUTION INTRAMUSCULAR; INTRAVITREAL at 08:03

## 2023-03-17 NOTE — OR NURSING
Notified Dr. Mar that pt has around 500 mL of urine in his bladder. Stated to in and out cath pt and give pt option to stay until able to urinate or discharge to home and come back to ER if unable to urinate

## 2023-03-17 NOTE — DISCHARGE INSTRUCTIONS

## 2023-03-17 NOTE — ANESTHESIA POSTPROCEDURE EVALUATION
Anesthesia Post Evaluation    Patient: Nagi Sharma    Procedure(s) Performed: Procedure(s) (LRB):  ARTHROSCOPY, SHOULDER (Right)  REPAIR, ROTATOR CUFF (Right)  RELEASE, TRIGGER FINGER (Right)    Final Anesthesia Type: general      Patient location during evaluation: PACU  Patient participation: Yes- Able to Participate  Level of consciousness: awake and alert  Post-procedure vital signs: reviewed and stable  Pain management: adequate  Airway patency: patent    PONV status at discharge: No PONV  Anesthetic complications: no      Cardiovascular status: blood pressure returned to baseline  Respiratory status: spontaneous ventilation  Hydration status: euvolemic  Follow-up not needed.          Vitals Value Taken Time   /78 03/17/23 1045   Temp 36.7 °C (98 °F) 03/17/23 1015   Pulse 68 03/17/23 1045   Resp 15 03/17/23 1045   SpO2 96 % 03/17/23 1045         Event Time   Out of Recovery 10:07:10         Pain/Mariann Score: Mariann Score: 10 (3/17/2023 10:45 AM)

## 2023-03-17 NOTE — ANESTHESIA PROCEDURE NOTES
Intubation    Date/Time: 3/17/2023 7:14 AM  Performed by: Sarai Coughlin CRNA  Authorized by: Rico Garcia MD     Intubation:     Induction:  Intravenous    Intubated:  Postinduction    Mask Ventilation:  Easy mask    Attempts:  1    Attempted By:  CRNA    Method of Intubation:  Video laryngoscopy    Blade:  Carvajal 3    Laryngeal View Grade: Grade I - full view of cords      Difficult Airway Encountered?: No      Complications:  None    Airway Device:  Oral endotracheal tube    Airway Device Size:  8.0    Style/Cuff Inflation:  Cuffed    Inflation Amount (mL):  5    Tube secured:  22    Secured at:  The lips    Placement Verified By:  Capnometry    Complicating Factors:  None    Findings Post-Intubation:  BS equal bilateral and atraumatic/condition of teeth unchanged

## 2023-03-17 NOTE — TRANSFER OF CARE
"Anesthesia Transfer of Care Note    Patient: Nagi Sahrma    Procedure(s) Performed: Procedure(s) (LRB):  ARTHROSCOPY, SHOULDER (Right)  REPAIR, ROTATOR CUFF (Right)  RELEASE, TRIGGER FINGER (Right)    Patient location: PACU    Anesthesia Type: general    Transport from OR: Transported from OR on 6-10 L/min O2 by face mask with adequate spontaneous ventilation    Post pain: adequate analgesia    Post assessment: no apparent anesthetic complications    Post vital signs: stable    Level of consciousness: awake    Nausea/Vomiting: no nausea/vomiting    Complications: none    Transfer of care protocol was followed      Last vitals:   Visit Vitals  BP (!) 146/72 (BP Location: Left arm, Patient Position: Lying)   Pulse 68   Temp 36.2 °C (97.1 °F) (Temporal)   Resp 16   Ht 5' 11" (1.803 m)   Wt 102.1 kg (225 lb)   SpO2 97%   BMI 31.38 kg/m²     "

## 2023-03-17 NOTE — BRIEF OP NOTE
[facility] - Surgery ([place])   Operative Note     SUMMARY     Surgery Date: 3/17/2023     Surgeon(s) and Role:     * Claude S. Williams IV, MD - Primary    Assisting Surgeon: None    Pre-op Diagnosis:  Complete tear of right rotator cuff, unspecified whether traumatic [M75.121]  Righrt small finger trigger    Post-op Diagnosis:  Post-Op Diagnosis Codes:     * Complete tear of right rotator cuff, unspecified whether traumatic [M75.121]  Right small finger trigger    Procedure(s) (LRB):  ARTHROSCOPY, SHOULDER (Right)  REPAIR, ROTATOR CUFF (Right)  RELEASE, TRIGGER FINGER (Right) Right small finger  Biceps tenotomy, acromioplasty, labral debridement.      Anesthesia: General    Description of the findings of the procedure: asabove    Findings/Key Components: as above    Estimated Blood Loss: * No values recorded between 3/17/2023  7:40 AM and 3/17/2023  8:58 AM *         Specimens:   Specimen (24h ago, onward)      None            Discharge Note    SUMMARY     Admit Date: 3/17/2023    Discharge Date and Time:  03/17/2023 9:00 AM    Hospital Course (synopsis of major diagnoses, care, treatment, and services provided during the course of the hospital stay): uneventful     Final Diagnosis: Post-Op Diagnosis Codes:     * Complete tear of right rotator cuff, unspecified whether traumatic [M75.121]    Disposition: Home or Self Care    Follow Up/Patient Instructions:     Medications:  Reconciled Home Medications:      Medication List        START taking these medications      oxyCODONE-acetaminophen 5-325 mg per tablet  Commonly known as: PERCOCET  Take 1 tablet by mouth every 6 (six) hours as needed for Pain.            CONTINUE taking these medications      allopurinoL 300 MG tablet  Commonly known as: ZYLOPRIM  Take 300 mg by mouth once daily.     amLODIPine 2.5 MG tablet  Commonly known as: NORVASC  Take 2.5 mg by mouth once daily.     aspirin 81 MG EC tablet  Commonly known as: ECOTRIN  Take 81 mg by mouth once daily.      atorvastatin 80 MG tablet  Commonly known as: LIPITOR  Take 80 mg by mouth once daily.     celecoxib 200 MG capsule  Commonly known as: CeleBREX  Take 200 mg by mouth.     COLCHICINE ORAL  Take by mouth as needed.     ezetimibe 10 mg tablet  Commonly known as: ZETIA  TAKE 1 TABLET EVERY DAY     HYDROcodone-acetaminophen 5-325 mg per tablet  Commonly known as: NORCO  Take 1 tablet by mouth every 4 (four) hours as needed for Pain.     metoprolol succinate 25 MG 24 hr tablet  Commonly known as: TOPROL-XL  Take 25 mg by mouth once daily.     rOPINIRole 0.5 MG tablet  Commonly known as: REQUIP  Take 0.5 mg by mouth 3 (three) times daily as needed.     sertraline 50 MG tablet  Commonly known as: ZOLOFT  Take 50 mg by mouth once daily.            ASK your doctor about these medications      predniSONE 2.5 MG tablet  Commonly known as: DELTASONE  Take 2.5 mg by mouth once daily.            Discharge Procedure Orders   Diet general     Leave dressing on - Keep it clean, dry, and intact until clinic visit     Call MD for:  temperature >100.4     Call MD for:  persistent nausea and vomiting     Call MD for:  severe uncontrolled pain     Call MD for:  difficulty breathing, headache or visual disturbances     Call MD for:  redness, tenderness, or signs of infection (pain, swelling, redness, odor or green/yellow discharge around incision site)     Call MD for:  hives     Call MD for:  persistent dizziness or light-headedness     Call MD for:  extreme fatigue     Ice to affected area     Lifting restrictions     No driving, operating heavy equipment or signing legal documents while taking pain medication     Sponge bath only until clinic visit      Follow-up Information       Claude S. Williams Iv, MD Follow up.    Specialty: Orthopedic Surgery  Why: For suture removal, For wound re-check  Contact information:  [address]  [facility] [id number] [phone]

## 2023-03-17 NOTE — ANESTHESIA PROCEDURE NOTES
Peripheral Block    Patient location during procedure: pre-op   Block not for primary anesthetic.  Reason for block: at surgeon's request and post-op pain management   Post-op Pain Location: Right Shoulder Pain   Start time: 3/17/2023 6:57 AM  Timeout: 3/17/2023 6:54 AM   End time: 3/17/2023 6:58 AM    Staffing  Authorizing Provider: Rico Garcia MD  Performing Provider: Rico Garcia MD    Preanesthetic Checklist  Completed: patient identified, IV checked, site marked, risks and benefits discussed, surgical consent, monitors and equipment checked, pre-op evaluation and timeout performed  Peripheral Block  Patient position: left lateral decubitus  Prep: ChloraPrep  Patient monitoring: heart rate, cardiac monitor, continuous pulse ox, continuous capnometry and frequent blood pressure checks  Block type: interscalene  Laterality: right  Injection technique: single shot  Needle  Needle type: Stimuplex   Needle gauge: 22 G  Needle length: 2 in  Needle localization: anatomical landmarks and ultrasound guidance   -ultrasound image captured on disc.  Assessment  Injection assessment: negative aspiration, negative parasthesia and local visualized surrounding nerve  Paresthesia pain: none  Heart rate change: no  Slow fractionated injection: yes  Pain Tolerance: comfortable throughout block and no complaints  Medications:    Medications: ropivacaine (NAROPIN) injection 0.5% - Perineural   30 mL - 3/17/2023 6:57:00 AM    Additional Notes  VSS.  [facility] RN monitoring vitals throughout procedure.  Patient tolerated procedure well.

## 2023-03-17 NOTE — ANESTHESIA PREPROCEDURE EVALUATION
03/17/2023  Nagi Sharma is a 72 y.o., male.      Pre-op Assessment    I have reviewed the Patient Summary Reports.     I have reviewed the Nursing Notes. I have reviewed the NPO Status.   I have reviewed the Medications.   Steroids Taken In Past Year: Prednisone    Review of Systems  Anesthesia Hx:  No problems with previous Anesthesia  History of prior surgery of interest to airway management or planning: Denies Family Hx of Anesthesia complications.   Denies Personal Hx of Anesthesia complications.   Social:  Former Smoker    Hematology/Oncology:  Hematology Normal   Oncology Normal     EENT/Dental:EENT/Dental Normal   Cardiovascular:   Hypertension CAD asymptomatic     Pulmonary:  Pulmonary Normal    Renal/:  Renal/ Normal     Hepatic/GI:  Hepatic/GI Normal    Musculoskeletal:   Arthritis  Polymyalgia Rheumatica   Neurological:  Neurology Normal    Endocrine:  Endocrine Normal    Dermatological:  Skin Normal    Psych:  Psychiatric Normal           Physical Exam  General: Cooperative, Alert, Well nourished and Oriented    Airway:  Mallampati: II   Mouth Opening: Normal  Neck ROM: Normal ROM    Dental:  Intact        Anesthesia Plan  Type of Anesthesia, risks & benefits discussed:    Anesthesia Type: Gen Supraglottic Airway  Intra-op Monitoring Plan: Standard ASA Monitors  Post Op Pain Control Plan: multimodal analgesia  Induction:  IV  Informed Consent: Informed consent signed with the Patient and all parties understand the risks and agree with anesthesia plan.  All questions answered.   ASA Score: 3  Anesthesia Plan Notes: Obseity, CAD, HTN, Polymyalgia Rheumatica    Ready For Surgery From Anesthesia Perspective.     .

## 2023-03-19 VITALS
TEMPERATURE: 98 F | HEIGHT: 71 IN | BODY MASS INDEX: 31.5 KG/M2 | WEIGHT: 225 LBS | OXYGEN SATURATION: 98 % | HEART RATE: 68 BPM | DIASTOLIC BLOOD PRESSURE: 80 MMHG | RESPIRATION RATE: 15 BRPM | SYSTOLIC BLOOD PRESSURE: 125 MMHG

## 2023-03-22 NOTE — OP NOTE
[facility] ([facility]   Operative Note     SUMMARY     Surgery Date: 3/17/2023     Surgeon(s) and Role:     * Claude S. Williams IV, MD - Primary    Assisting Surgeon: None    Pre-op Diagnosis:  Complete tear of right rotator cuff, unspecified whether traumatic [M75.121]  Right shoulder superior labral tear, right shoulder osteoarthritis, impingement.  Biceps tendonitis.  Right small finger flexor stenosing tenosynovitis    Post-op Diagnosis:  As above    Procedure(s) (LRB):  ARTHROSCOPY, SHOULDER (Right)  REPAIR, ROTATOR CUFF (Right)  RELEASE, TRIGGER FINGER (Right)  ACROMIOPLASTY  DEBRIDEMENT  Right shoulder biceps tenotomy, superior labral debridement.    Anesthesia: General    Description of the findings of the procedure:  As above    Findings/Key Components:  As above    Surgery Date: 03/22/2023  Patient Name: Nagi Sharma  CSN: [id number]  Surgeon(s) and Role:    Claude S. Williams IV, MD - Primary    Pre-op Diagnosis:  Complete tear of right rotator cuff, unspecified whether traumatic [M75.121]    Post-op Diagnosis:  Complete tear of right rotator cuff, unspecified whether traumatic [M75.121]    Procedure(s) (LRB):  ARTHROSCOPY, SHOULDER (Right)  REPAIR, ROTATOR CUFF (Right)  RELEASE, TRIGGER FINGER (Right)  ACROMIOPLASTY  DEBRIDEMENT    INDICATIONS: This patient has had pain and catching of the digit  which has been unrelieved   with conservative measures.After the potential benefits as well as potential risks and   complications of operative decompression of the carpal canal were reviewed with   the patient, she has elected to undergo the above procedure.     PROCEDURE IN DETAIL: After proper informed consent, the patient was transported   to the Operating Suite.  The patient underwent general endotracheal anesthesia per the anesthesiologist without difficulty following an interscalene block to the right upper extremity.  The patient was placed into a beach chair position all bony prominences were  well padded.  The right hand was thoroughly prepped with alcohol,   Betadine and draped in the usual sterile fashion on a hand table. Preoperative routine timeout   was taken, and the operative site was identified by the operative team. After   Esmarch exsanguination, the Esmarch was used as a tourniquet. An incision was then made in the right palm overlying the right small finger A1 pulley and careful dissection was carried down through the subdermal tissue using bipolar cautery for hemostasisand careful dissection was carried down through the subdermal tissue using bipolar cautery for hemostasis.  Care was taken to protect the neurovascular structures as careful dissection was carried down to the level of the flexor sheath.  The A1 pulley was identified as was the more proximal palmar pulley.  The sheath was divided including the entire A1 pulley and palmar pulley under direct visualization.  No pathology of the flexor tendon was noted. The patient was awakened and was able to flex and extend the digits fully without any further catching.  The tourniquet was deflated and meticulous hemostasis was confirmed.  The incision was then closed with nylon interrupted suture. A sterile soft dressing was then applied.   The hand table was removed.  The right upper extremity was carefully placed into a well-padded shoulder positioner.  A standard posterior arthroscopic portal was then established and a comprehensive arthroscopy of the glenohumeral joint space was carried out.  An anterior portal was established and a probe was introduced.  Operative findings include some degenerative changes of the humeral head and glenoid grade 2 with degenerative tearing of the superior labrum.  The biceps was degenerative and was released.  The superior labrum was debrided with the arthroscopic shaver noted to have significant degenerative changes of the superior labrum and partial tearing.  There was noted to be a large significantly  retracted tear of the rotator cuff supraspinatus and infraspinatus tendons.  There was some only degenerative partial tearing of the subscapularis which was overall stable and intact.  No loose bodies were identified.  The instrumentation was then removed and the arthroscope was placed from the posterior portal into the subacromial space.  A lateral portal was established and a comprehensive bursectomy was carried out using the arthroscopic shaver.  A large tear of the rotator cuff was identified however this was fairly mobile and appeared to be quality tissue.  The anatomic insertion site was then prepared with the arthroscopic shaver and lightly with the arthroscopic bur.  Definitive repair was then carried out using 2 medial row Linvatec con Med Y knot suture anchors which had excellent stable fixation at the medial aspect of the greater tuberosity near the articular surface.  The sutures were passed using a suture Passer through the rotator cuff and it was quite mobile and was able to be repaired with a stable medial row repair using these mattress sutures.  The suture limbs were then brought out of the lateral portal and secured to the lateral aspect of the greater tuberosity using a con Med knotless suture anchor.  The rotator cuff repair was quite stable taken through a full range of motion with no displacement.  An acromioplasty was carried out using the arthroscopic bur from the posterior portal in a standard cutting block technique.  On final inspection and full motion the shoulder had no impingement was stable repair of the rotator cuff.  The shoulder was then evacuated and the portals were closed with nylon interrupted suture.  A sterile soft dressing was applied.  The patient was awakened in the operating suite and taken to recovery in stable condition he tolerated the procedure very well.  Lap instrument and needle counts were correct.  Blood loss minimal.  Complications none.    Anesthesia:  General    Estimated Blood Loss: minimal         Specimens     None

## 2024-10-29 ENCOUNTER — ANESTHESIA EVENT (OUTPATIENT)
Dept: SURGERY | Facility: OTHER | Age: 74
End: 2024-10-29
Payer: MEDICARE

## 2024-10-30 ENCOUNTER — HOSPITAL ENCOUNTER (OUTPATIENT)
Dept: PREADMISSION TESTING | Facility: OTHER | Age: 74
Discharge: HOME | End: 2024-10-30
Attending: ORTHOPAEDIC SURGERY
Payer: MEDICARE

## 2024-10-30 VITALS
HEART RATE: 53 BPM | HEIGHT: 71 IN | OXYGEN SATURATION: 96 % | BODY MASS INDEX: 31.38 KG/M2 | DIASTOLIC BLOOD PRESSURE: 68 MMHG | TEMPERATURE: 97 F | SYSTOLIC BLOOD PRESSURE: 120 MMHG

## 2024-10-30 DIAGNOSIS — M23.203 DERANGEMENT OF MEDIAL MENISCUS OF RIGHT KNEE DUE TO OLD INJURY: Primary | ICD-10-CM

## 2024-10-30 RX ORDER — CLOPIDOGREL BISULFATE 75 MG/1
75 TABLET ORAL DAILY
COMMUNITY

## 2024-10-30 RX ORDER — ALBUTEROL SULFATE 90 UG/1
INHALANT RESPIRATORY (INHALATION)
COMMUNITY
Start: 2024-09-30

## 2024-10-30 RX ORDER — LOSARTAN POTASSIUM 50 MG/1
50 TABLET ORAL DAILY
COMMUNITY

## 2024-10-30 RX ORDER — LIDOCAINE HYDROCHLORIDE 10 MG/ML
0.5 INJECTION, SOLUTION EPIDURAL; INFILTRATION; INTRACAUDAL; PERINEURAL ONCE
Status: CANCELLED | OUTPATIENT
Start: 2024-10-30 | End: 2024-10-30

## 2024-10-30 RX ORDER — PREGABALIN 100 MG/1
100 CAPSULE ORAL DAILY
COMMUNITY

## 2024-10-30 RX ORDER — SODIUM CHLORIDE, SODIUM LACTATE, POTASSIUM CHLORIDE, CALCIUM CHLORIDE 600; 310; 30; 20 MG/100ML; MG/100ML; MG/100ML; MG/100ML
INJECTION, SOLUTION INTRAVENOUS CONTINUOUS
Status: CANCELLED | OUTPATIENT
Start: 2024-10-30

## 2024-11-01 ENCOUNTER — HOSPITAL ENCOUNTER (OUTPATIENT)
Facility: OTHER | Age: 74
Discharge: HOME | End: 2024-11-01
Attending: ORTHOPAEDIC SURGERY | Admitting: ORTHOPAEDIC SURGERY
Payer: MEDICARE

## 2024-11-01 ENCOUNTER — ANESTHESIA (OUTPATIENT)
Dept: SURGERY | Facility: OTHER | Age: 74
End: 2024-11-01
Payer: MEDICARE

## 2024-11-01 DIAGNOSIS — I16.1 HYPERTENSIVE EMERGENCY: ICD-10-CM

## 2024-11-01 DIAGNOSIS — J81.0 ACUTE PULMONARY EDEMA: ICD-10-CM

## 2024-11-01 DIAGNOSIS — M23.203 DERANGEMENT OF MEDIAL MENISCUS OF RIGHT KNEE DUE TO OLD INJURY: Primary | ICD-10-CM

## 2024-11-01 DIAGNOSIS — R04.2 HEMOPTYSIS: ICD-10-CM

## 2024-11-01 PROCEDURE — 25000003 PHARM REV CODE 250: Performed by: ANESTHESIOLOGY

## 2024-11-01 PROCEDURE — 71000015 HC POSTOP RECOV 1ST HR: Performed by: ORTHOPAEDIC SURGERY

## 2024-11-01 PROCEDURE — 37000009 HC ANESTHESIA EA ADD 15 MINS: Performed by: ORTHOPAEDIC SURGERY

## 2024-11-01 PROCEDURE — 63600175 PHARM REV CODE 636 W HCPCS: Performed by: ORTHOPAEDIC SURGERY

## 2024-11-01 PROCEDURE — 71000016 HC POSTOP RECOV ADDL HR: Performed by: ORTHOPAEDIC SURGERY

## 2024-11-01 PROCEDURE — 63600175 PHARM REV CODE 636 W HCPCS: Performed by: ANESTHESIOLOGY

## 2024-11-01 PROCEDURE — 27201423 OPTIME MED/SURG SUP & DEVICES STERILE SUPPLY: Performed by: ORTHOPAEDIC SURGERY

## 2024-11-01 PROCEDURE — 36000710: Performed by: ORTHOPAEDIC SURGERY

## 2024-11-01 PROCEDURE — 25000003 PHARM REV CODE 250: Performed by: ORTHOPAEDIC SURGERY

## 2024-11-01 PROCEDURE — 25000003 PHARM REV CODE 250: Performed by: STUDENT IN AN ORGANIZED HEALTH CARE EDUCATION/TRAINING PROGRAM

## 2024-11-01 PROCEDURE — 36000711: Performed by: ORTHOPAEDIC SURGERY

## 2024-11-01 PROCEDURE — 71000033 HC RECOVERY, INTIAL HOUR: Performed by: ORTHOPAEDIC SURGERY

## 2024-11-01 PROCEDURE — 37000008 HC ANESTHESIA 1ST 15 MINUTES: Performed by: ORTHOPAEDIC SURGERY

## 2024-11-01 PROCEDURE — 71000039 HC RECOVERY, EACH ADD'L HOUR: Performed by: ORTHOPAEDIC SURGERY

## 2024-11-01 PROCEDURE — 63600175 PHARM REV CODE 636 W HCPCS: Performed by: STUDENT IN AN ORGANIZED HEALTH CARE EDUCATION/TRAINING PROGRAM

## 2024-11-01 RX ORDER — SODIUM CHLORIDE, SODIUM LACTATE, POTASSIUM CHLORIDE, CALCIUM CHLORIDE 600; 310; 30; 20 MG/100ML; MG/100ML; MG/100ML; MG/100ML
INJECTION, SOLUTION INTRAVENOUS CONTINUOUS
Status: DISCONTINUED | OUTPATIENT
Start: 2024-11-01 | End: 2024-11-01 | Stop reason: HOSPADM

## 2024-11-01 RX ORDER — FENTANYL CITRATE 50 UG/ML
INJECTION, SOLUTION INTRAMUSCULAR; INTRAVENOUS
Status: DISCONTINUED | OUTPATIENT
Start: 2024-11-01 | End: 2024-11-01

## 2024-11-01 RX ORDER — EPHEDRINE SULFATE 50 MG/ML
INJECTION, SOLUTION INTRAVENOUS
Status: DISCONTINUED | OUTPATIENT
Start: 2024-11-01 | End: 2024-11-01

## 2024-11-01 RX ORDER — PROCHLORPERAZINE EDISYLATE 5 MG/ML
5 INJECTION INTRAMUSCULAR; INTRAVENOUS EVERY 30 MIN PRN
Status: DISCONTINUED | OUTPATIENT
Start: 2024-11-01 | End: 2024-11-01 | Stop reason: HOSPADM

## 2024-11-01 RX ORDER — PROPOFOL 10 MG/ML
VIAL (ML) INTRAVENOUS
Status: DISCONTINUED | OUTPATIENT
Start: 2024-11-01 | End: 2024-11-01

## 2024-11-01 RX ORDER — FUROSEMIDE 10 MG/ML
INJECTION INTRAMUSCULAR; INTRAVENOUS
Status: DISCONTINUED | OUTPATIENT
Start: 2024-11-01 | End: 2024-11-01

## 2024-11-01 RX ORDER — GLUCAGON 1 MG
1 KIT INJECTION
Status: DISCONTINUED | OUTPATIENT
Start: 2024-11-01 | End: 2024-11-01 | Stop reason: HOSPADM

## 2024-11-01 RX ORDER — LABETALOL HYDROCHLORIDE 5 MG/ML
INJECTION, SOLUTION INTRAVENOUS
Status: DISCONTINUED | OUTPATIENT
Start: 2024-11-01 | End: 2024-11-01

## 2024-11-01 RX ORDER — NITROGLYCERIN 0.3 MG/1
0.3 TABLET SUBLINGUAL
COMMUNITY
Start: 2023-11-27 | End: 2024-11-26

## 2024-11-01 RX ORDER — DEXAMETHASONE SODIUM PHOSPHATE 4 MG/ML
INJECTION, SOLUTION INTRA-ARTICULAR; INTRALESIONAL; INTRAMUSCULAR; INTRAVENOUS; SOFT TISSUE
Status: DISCONTINUED | OUTPATIENT
Start: 2024-11-01 | End: 2024-11-01

## 2024-11-01 RX ORDER — MEPERIDINE HYDROCHLORIDE 25 MG/ML
12.5 INJECTION INTRAMUSCULAR; INTRAVENOUS; SUBCUTANEOUS ONCE AS NEEDED
Status: DISCONTINUED | OUTPATIENT
Start: 2024-11-01 | End: 2024-11-01 | Stop reason: HOSPADM

## 2024-11-01 RX ORDER — CEFAZOLIN SODIUM 1 G/3ML
2 INJECTION, POWDER, FOR SOLUTION INTRAMUSCULAR; INTRAVENOUS
Status: COMPLETED | OUTPATIENT
Start: 2024-11-01 | End: 2024-11-01

## 2024-11-01 RX ORDER — OXYCODONE HYDROCHLORIDE 5 MG/1
5 TABLET ORAL
Status: DISCONTINUED | OUTPATIENT
Start: 2024-11-01 | End: 2024-11-01 | Stop reason: HOSPADM

## 2024-11-01 RX ORDER — HYDROCODONE BITARTRATE AND ACETAMINOPHEN 5; 325 MG/1; MG/1
1 TABLET ORAL EVERY 6 HOURS PRN
Qty: 20 TABLET | Refills: 0 | Status: SHIPPED | OUTPATIENT
Start: 2024-11-01

## 2024-11-01 RX ORDER — LIDOCAINE HYDROCHLORIDE 10 MG/ML
0.5 INJECTION, SOLUTION EPIDURAL; INFILTRATION; INTRACAUDAL; PERINEURAL ONCE
Status: DISCONTINUED | OUTPATIENT
Start: 2024-11-01 | End: 2024-11-01 | Stop reason: HOSPADM

## 2024-11-01 RX ORDER — ONDANSETRON HYDROCHLORIDE 2 MG/ML
INJECTION, SOLUTION INTRAVENOUS
Status: DISCONTINUED | OUTPATIENT
Start: 2024-11-01 | End: 2024-11-01

## 2024-11-01 RX ORDER — SUCCINYLCHOLINE CHLORIDE 20 MG/ML
INJECTION INTRAMUSCULAR; INTRAVENOUS
Status: DISCONTINUED | OUTPATIENT
Start: 2024-11-01 | End: 2024-11-01

## 2024-11-01 RX ORDER — BUPIVACAINE HYDROCHLORIDE AND EPINEPHRINE 5; 5 MG/ML; UG/ML
INJECTION, SOLUTION EPIDURAL; INTRACAUDAL; PERINEURAL
Status: DISCONTINUED | OUTPATIENT
Start: 2024-11-01 | End: 2024-11-01 | Stop reason: HOSPADM

## 2024-11-01 RX ORDER — HYDROMORPHONE HYDROCHLORIDE 2 MG/ML
0.4 INJECTION, SOLUTION INTRAMUSCULAR; INTRAVENOUS; SUBCUTANEOUS EVERY 5 MIN PRN
Status: DISCONTINUED | OUTPATIENT
Start: 2024-11-01 | End: 2024-11-01 | Stop reason: HOSPADM

## 2024-11-01 RX ORDER — LIDOCAINE HYDROCHLORIDE 20 MG/ML
INJECTION INTRAVENOUS
Status: DISCONTINUED | OUTPATIENT
Start: 2024-11-01 | End: 2024-11-01

## 2024-11-01 RX ORDER — SODIUM CHLORIDE 0.9 % (FLUSH) 0.9 %
3 SYRINGE (ML) INJECTION
Status: DISCONTINUED | OUTPATIENT
Start: 2024-11-01 | End: 2024-11-01 | Stop reason: HOSPADM

## 2024-11-01 RX ADMIN — LABETALOL HYDROCHLORIDE 15 MG: 5 INJECTION INTRAVENOUS at 08:11

## 2024-11-01 RX ADMIN — ONDANSETRON HYDROCHLORIDE 4 MG: 2 INJECTION INTRAMUSCULAR; INTRAVENOUS at 07:11

## 2024-11-01 RX ADMIN — SODIUM CHLORIDE, SODIUM LACTATE, POTASSIUM CHLORIDE, AND CALCIUM CHLORIDE: 600; 310; 30; 20 INJECTION, SOLUTION INTRAVENOUS at 07:11

## 2024-11-01 RX ADMIN — PROPOFOL 100 MG: 10 INJECTION, EMULSION INTRAVENOUS at 07:11

## 2024-11-01 RX ADMIN — FUROSEMIDE 20 MG: 10 INJECTION, SOLUTION INTRAMUSCULAR; INTRAVENOUS at 08:11

## 2024-11-01 RX ADMIN — LIDOCAINE HYDROCHLORIDE 60 MG: 20 INJECTION, SOLUTION INTRAVENOUS at 08:11

## 2024-11-01 RX ADMIN — PROPOFOL 180 MG: 10 INJECTION, EMULSION INTRAVENOUS at 07:11

## 2024-11-01 RX ADMIN — OXYCODONE HYDROCHLORIDE 5 MG: 5 TABLET ORAL at 09:11

## 2024-11-01 RX ADMIN — SUGAMMADEX 200 MG: 100 INJECTION, SOLUTION INTRAVENOUS at 08:11

## 2024-11-01 RX ADMIN — DEXAMETHASONE SODIUM PHOSPHATE 4 MG: 4 INJECTION, SOLUTION INTRAMUSCULAR; INTRAVENOUS at 07:11

## 2024-11-01 RX ADMIN — SUCCINYLCHOLINE CHLORIDE 180 MG: 20 INJECTION, SOLUTION INTRAMUSCULAR; INTRAVENOUS at 07:11

## 2024-11-01 RX ADMIN — FENTANYL CITRATE 100 MCG: 50 INJECTION, SOLUTION INTRAMUSCULAR; INTRAVENOUS at 07:11

## 2024-11-01 RX ADMIN — CEFAZOLIN 2 G: 330 INJECTION, POWDER, FOR SOLUTION INTRAMUSCULAR; INTRAVENOUS at 07:11

## 2024-11-01 RX ADMIN — LIDOCAINE HYDROCHLORIDE 100 MG: 20 INJECTION, SOLUTION INTRAVENOUS at 07:11

## 2024-11-01 RX ADMIN — EPHEDRINE SULFATE 10 MG: 50 INJECTION INTRAVENOUS at 07:11

## 2024-11-01 NOTE — BRIEF OP NOTE
Surgery Date: 11/01/2024  Patient Name: Nagi Sharma  CSN: [id number]  Surgeon(s) and Role:    Claude S. 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  The patient underwent general endotracheal anesthesia per anesthesiologist.  The right lower extremity was placed into a well-padded thigh tourniquet and arthroscopic leg loco.  The right lower extremity was then thoroughly prepped with alcohol ChloraPrep and draped in the usual sterile fashion.  Preoperative antibiotics were administered.  Examination of the right knee under anesthesia demonstrated less than 5 degree flexion contracture with flexion to approximately 120°.  There was noted to be varus alignment with no pathologic instability.  Trace effusion.  The patella appeared to be tracking well with some crepitation.  After Esmarch exsanguination the well-padded upper thigh tourniquet was elevated to 300 mmHg.  A standard anterolateral portal was established and a comprehensive arthroscopy was carried out.  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  The patella was tracking well.  There was significant synovitis of the pouch and mediolateral gutters with no loose bodies.  The anterior posterior cruciate ligaments were stable and intact.  The medial compartment had significant degenerative changes with complete eburnation of the articular cartilage of the tibia and grade 3 and small area of grade 4 chondromalacia of the femoral condyle.  There was tearing of the medial meniscus.  An anteromedial portal was established and the probe was  introduced.  Arthroscopic forceps and the arthroscopic shaver was used to debride the torn portions of the medial meniscus.  The anterior and posterior cruciate ligaments were intact.  The lateral compartment was then visualized and there was some tearing of the lateral meniscus.  Mostly peripheral degenerative with no significant detachment.  There was some grade 2 chondromalacia of the femoral condyle and tibial plateau.  No loose bodies were identified.  Further synovectomy was completed and the knee was further lavaged.  On 2nd look no further pathology was noted.  The fluid was evacuated and the portals were closed with interrupted nylon suture.  The tourniquet was deflated with a sterile soft dressing on the right knee.  There was normal circulation after deflation of the tourniquet and application of the dressing.  Attention was then turned to the right upper extremity. The right hand was thoroughly prepped with alcohol,   Betadine and draped in the usual sterile fashion. Preoperative routine timeout   was again taken, and the operative site was identified by the operative team. After   Esmarch exsanguination, a padded upper arm tourniquet was then elevated to 250   mmHg.  0.25% Marcaine with epinephrine approximately 3 cc was infiltrated to the right palm.  An incision was then made in the right palm overlying the right index finger A1 pulley and careful dissection was carried down through the subdermal tissue using bipolar cautery for hemostasisand careful dissection was carried down through the subdermal tissue using bipolar cautery for hemostasis.  Care was taken to protect the neurovascular structures as careful dissection was carried down to the level of the flexor sheath.  The A1 pulley was identified as was the more proximal palmar pulley.  The sheath was divided including the entire A1 pulley and palmar pulley under direct visualization.  No pathology of the flexor tendon was noted. The patient was  awakened and was able to flex and extend the digits fully without any further catching.  The tourniquet was deflated and meticulous hemostasis was confirmed.  The incision was then closed with nylon interrupted suture. A sterile soft dressing was then applied. The patient was awakened in the operative suite and taken to the recovery area in stable condition. The procedure was tolerated very well.  Lap instrument and needle counts were correct.        COMPLICATIONS: None.      Anesthesia: General    Estimated Blood Loss: minimal         Specimens     None          Discharge Note    SUMMARY     Admit Date: 11/1/2024    Discharge Date and Time:  11/01/2024 9:23 AM    Hospital Course (synopsis of major diagnoses, care, treatment, and services provided during the course of the hospital stay):  Postoperatively the patient was comfortable alert and comfortable with the use of crutches prior to discharge.    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            CONTINUE taking these medications      albuterol 90 mcg/actuation inhaler  Commonly known as: PROVENTIL/VENTOLIN HFA  TAKE ONE PUFF BY MOUTH EVERY 20 minutes x 3 then discontinue for 5 hours then repeat cycle indefinitely AS NEEDED FOR WHEEZING     allopurinoL 300 MG tablet  Commonly known as: ZYLOPRIM  Take 300 mg by mouth once daily.     amLODIPine 2.5 MG tablet  Commonly known as: NORVASC  Take 2.5 mg by mouth once daily.     aspirin 81 MG EC tablet  Commonly known as: ECOTRIN  Take 81 mg by mouth once daily.     atorvastatin 80 MG tablet  Commonly known as: LIPITOR  Take 80 mg  "by mouth once daily.     celecoxib 200 MG capsule  Commonly known as: CeleBREX  Take 200 mg by mouth once daily.     clopidogreL 75 mg tablet  Commonly known as: PLAVIX  Take 75 mg by mouth once daily. Will stop 10/31-per cardiologist dr mohsen     ezetimibe 10 mg tablet  Commonly known as: ZETIA  TAKE 1 TABLET EVERY DAY     losartan 50 MG tablet  Commonly known as: COZAAR  Take 50 mg by mouth once daily.     metoprolol succinate 25 MG 24 hr tablet  Commonly known as: TOPROL-XL  Take 25 mg by mouth once daily.     nitroGLYCERIN 0.3 MG SL tablet  Commonly known as: NITROSTAT  Place 0.3 mg under the tongue.     pregabalin 100 MG capsule  Commonly known as: LYRICA  Take 100 mg by mouth once daily.     rOPINIRole 0.5 MG tablet  Commonly known as: REQUIP  Take 0.5 mg by mouth 3 (three) times daily as needed.     sertraline 50 MG tablet  Commonly known as: ZOLOFT  Take 100 mg by mouth once daily.            Discharge Procedure Orders   CRUTCHES FOR HOME USE     Order Specific Question Answer Comments   Type: Axillary    Height: 5' 11" (1.803 m)    Weight: 105    Length of need (1-99 months): 1      Diet general     Activity as tolerated     Sponge bath only until clinic visit     Ice to affected area     Weight bearing restrictions (specify)   Order Comments: Crutches: partial WB as tolerated on Rt LE     Leave dressing on - Keep it clean, dry, and intact until clinic visit     Call MD for:  temperature >100.4     Call MD for:  persistent nausea and vomiting     Call MD for:  severe uncontrolled pain     Call MD for:  difficulty breathing, headache or visual disturbances     Call MD for:  redness, tenderness, or signs of infection (pain, swelling, redness, odor or green/yellow discharge around incision site)     Call MD for:  hives     Call MD for:  persistent dizziness or light-headedness     Call MD for:  extreme fatigue     SLR (straight leg raises), Quad Sets, ROM - active and passive   Standing Status: Future " Standing Exp. Date: 11/01/25      Follow-up Information       Williams, Claude S. IV, MD Follow up in 3 day(s).    Specialty: Orthopedic Surgery  Why: For wound re-check, For suture removal  Contact information:  [address]  [facility] [id number] [phone]

## 2024-11-01 NOTE — PROCEDURES
Bronchoscopy Procedure Note      Date of Operation: 11/01/2024     Pre-op Diagnosis: Hemoptysis    Post-op Diagnosis: Hemoptysis    Surgeons: Justin Ellerman  (fellow) &  Marie Jensen (staff)    Assistants:     Anesthesia:   General. Managed by anesthesiologist     Operation: Flexible fiberoptic bronchoscopy    Specimen: None    Estimated Blood Loss: Minimal    Indications:   The patient is a 74yo M who was undergoing general anesthesia for outpatient orthopedic prodcures who had sudden onset hemoptysis.    Description of Procedure:  The flexible bronchoscope was introduced into the ETT w/ the tip at the ulz. On inspection, there was some bright red blood pooled in the R main, R bronchus intermedius without significant extension to the segmental branches. This was lavaged with 20cc NS and clots and blood were suctioned. No active filling present after suctioning.    The L side was then inspected with some red blood and clots as well. 10cc NS lavage performed with removal of blood and clot as well. No evidence of the bleeding in the deeper lobes.     Lastly, the scope was present as the patient was extubated with a brief inspection of the trachea without evidence of any bleeding in the trachea from the ETT tip and proximally. No bleeding in the posterior OP or nose.     The patient was then transferred to PACU in stable condition.    Justin Ellerman  [employer] [facility] Fellow

## 2024-11-01 NOTE — ASSESSMENT & PLAN NOTE
Mr. Sharma had an acute episode of bright red hemoptysis that appeared to be self-limited. On bronchoscopic evaluation, there was some bright red blood and clots noted in both sides but seemed limited to the larger airways. After cleaning out clots, there did not appear to be any further bleeding or filling of the airways. Brief survey showed all major segments open. We feel that this is most likely capillaritis related to a hypertensive crisis while on DAPT. We stayed in the OR for extubation had only a brief look at the trachea that was bloodless as the tube was removed. Would anticipate some blood coming up for the next 24 hours but in decreasing amounts.   - likely self-limited capillaritis   - no further intervention needed  - keep Bps < 180  - DAPT per post-op instructions

## 2024-11-01 NOTE — SUBJECTIVE & OBJECTIVE
Past Medical History:   Diagnosis Date    CAD (coronary artery disease)     Gout     High cholesterol     HTN (hypertension)     Polymyalgia rheumatica     RLS (restless legs syndrome)        Past Surgical History:   Procedure Laterality Date    ACROMIOPLASTY  3/17/2023    Procedure: ACROMIOPLASTY;  Surgeon: Claude S. Williams IV, MD;  Location: [facility];  Service: Orthopedics;;    APPENDECTOMY      DEBRIDEMENT  3/17/2023    Procedure: DEBRIDEMENT;  Surgeon: Claude S. Williams IV, MD;  Location: [facility];  Service: Orthopedics;;    INTERPOSITION ARTHROPLASTY OF CARPOMETACARPAL JOINTS Left 2022    Procedure: INTERPOSITION ARTHROPLASTY, CMC JOINT;  Surgeon: Claude S. Williams IV, MD;  Location: [facility];  Service: Orthopedics;  Laterality: Left;  CARPOMETACARPAL ( CMC)    KNEE ARTHROPLASTY Left     ROTATOR CUFF REPAIR Right 3/17/2023    Procedure: REPAIR, ROTATOR CUFF;  Surgeon: Claude S. Williams IV, MD;  Location: [facility];  Service: Orthopedics;  Laterality: Right;    SHOULDER ARTHROSCOPY Right 3/17/2023    Procedure: ARTHROSCOPY, SHOULDER;  Surgeon: Claude S. Williams IV, MD;  Location: [facility];  Service: Orthopedics;  Laterality: Right;  1 HR    TRIGGER FINGER RELEASE Right 3/17/2023    Procedure: RELEASE, TRIGGER FINGER;  Surgeon: Claude S. Williams IV, MD;  Location: [facility];  Service: Orthopedics;  Laterality: Right;       Review of patient's allergies indicates:  No Known Allergies    Family History    None       Tobacco Use    Smoking status: Former     Current packs/day: 0.00     Average packs/day: 2.0 packs/day for 15.0 years (30.0 ttl pk-yrs)     Types: Cigarettes     Start date: 1965     Quit date: 1980     Years since quittin.8    Smokeless tobacco: Never   Substance and Sexual Activity    Alcohol use: Not Currently     Alcohol/week: 2.0 standard drinks of alcohol     Types: 2 Cans of beer per week    Drug use: Never    Sexual activity: Not on file         Review of Systems   Unable to  "perform ROS: Intubated     Objective:     Vital Signs (Most Recent):  Temp: 97.1 °F (36.2 °C) (11/01/24 0857)  Pulse: (!) 49 (11/01/24 0930)  Resp: 16 (11/01/24 0930)  BP: 119/61 (11/01/24 0930)  SpO2: 98 % (11/01/24 0930) Vital Signs (24h Range):  Temp:  [97.1 °F (36.2 °C)-97.6 °F (36.4 °C)] 97.1 °F (36.2 °C)  Pulse:  [48-63] 49  Resp:  [16-18] 16  SpO2:  [90 %-98 %] 98 %  BP: (119-133)/(58-70) 119/61        Body mass index is 31.38 kg/m².      Intake/Output Summary (Last 24 hours) at 11/1/2024 1012  Last data filed at 11/1/2024 0943  Gross per 24 hour   Intake 575 ml   Output --   Net 575 ml        Physical Exam  Vitals and nursing note reviewed.   Eyes:      Comments: Taped shut for procedure   Cardiovascular:      Rate and Rhythm: Regular rhythm. Bradycardia present.   Pulmonary:      Comments: Paralyzed, sedated, mechanical breath sounds    For bronchoscopic evaluation, see procedure note.  Musculoskeletal:      Comments: RLE with surgical bandage and  brace   Skin:     General: Skin is warm and dry.   Neurological:      Comments: Intubated, sedated, paralyzed          Vents:       Lines/Drains/Airways       Airway  Duration                  Airway - Non-Surgical 11/01/24 0744 <1 day         Airway - Non-Surgical 11/01/24 0744 LMA <1 day              Peripheral Intravenous Line  Duration                  Peripheral IV - Single Lumen 11/01/24 0645 20 G Hand <1 day                    Significant Labs:    CBC/Anemia Profile:  No results for input(s): "WBC", "HGB", "HCT", "PLT", "MCV", "RDW", "IRON", "FERRITIN", "RETIC", "FOLATE", "[id number]", "OCCULTBLOOD" in the last 48 hours.     Chemistries:  No results for input(s): "NA", "K", "CL", "CO2", "BUN", "CREATININE", "CALCIUM", "ALBUMIN", "PROT", "BILITOT", "ALKPHOS", "ALT", "AST", "GLUCOSE", "MG", "PHOS" in the last 48 hours.    All pertinent labs within the past 24 hours have been reviewed.    Significant Imaging:   I have reviewed all pertinent imaging " results/findings within the past 24 hours.  No chest imaging

## 2024-11-01 NOTE — HPI
72yo M w/ PMH HTN and knee pain was undergoing surgical intervention in the OR when there was an acute episode of hemoptysis after intubation. Pulmonary team was consulted for bronchoscopic evaluation during the OR. Of note, the patient was on ASA/Plavix that was held for surgery.    According to the CRNA, the patient started out hypertensive w/ SBP's in the 170s at the beginning of the case. This eventually spiked to 220 at one point with increasing oxygen requirements and then hemoptysis being suctioned through the ETT. Patient initially had and LMA but needed to be paralyzed and was converted to ETT. There was no apparent injury nose or posterior oropharynx bleeding during the intubation process. It wasn't until after intubation and securing of the ETT that the patient developed bright red blood.    Pulmonary was consulted intra-operatively for evaluation of new onset hemoptysis and oxygen requirements.

## 2024-11-01 NOTE — CONSULTS
[facility])  Pulmonology  Consult Note    Patient Name: Nagi Sharma  MRN: [id number]  Admission Date: 11/1/2024  Hospital Length of Stay: 0 days  Code Status: No Order  Attending Physician: Williams, Claude S. IV, *  Primary Care Provider: No, Primary Doctor   Principal Problem: Derangement of medial meniscus of right knee due to old injury    Inpatient consult to Pulmonology  Consult performed by: Ellerman, Justin, MD  Consult ordered by: Conchita Pinto CRNA        Subjective:     HPI:  74yo M w/ PMH HTN and knee pain was undergoing surgical intervention in the OR when there was an acute episode of hemoptysis after intubation. Pulmonary team was consulted for bronchoscopic evaluation during the OR. Of note, the patient was on ASA/Plavix that was held for surgery.    According to the CRNA, the patient started out hypertensive w/ SBP's in the 170s at the beginning of the case. This eventually spiked to 220 at one point with increasing oxygen requirements and then hemoptysis being suctioned through the ETT. Patient initially had and LMA but needed to be paralyzed and was converted to ETT. There was no apparent injury nose or posterior oropharynx bleeding during the intubation process. It wasn't until after intubation and securing of the ETT that the patient developed bright red blood.    Pulmonary was consulted intra-operatively for evaluation of new onset hemoptysis and oxygen requirements.     Past Medical History:   Diagnosis Date    CAD (coronary artery disease)     Gout     High cholesterol     HTN (hypertension)     Polymyalgia rheumatica     RLS (restless legs syndrome)        Past Surgical History:   Procedure Laterality Date    ACROMIOPLASTY  3/17/2023    Procedure: ACROMIOPLASTY;  Surgeon: Claude S. Williams IV, MD;  Location: [facility];  Service: Orthopedics;;    APPENDECTOMY      DEBRIDEMENT  3/17/2023    Procedure: DEBRIDEMENT;  Surgeon: Claude S. Williams IV, MD;  Location: [facility];   Service: Orthopedics;;    INTERPOSITION ARTHROPLASTY OF CARPOMETACARPAL JOINTS Left 2022    Procedure: INTERPOSITION ARTHROPLASTY, CMC JOINT;  Surgeon: Claude S. Williams IV, MD;  Location: [facility];  Service: Orthopedics;  Laterality: Left;  CARPOMETACARPAL ( CMC)    KNEE ARTHROPLASTY Left     ROTATOR CUFF REPAIR Right 3/17/2023    Procedure: REPAIR, ROTATOR CUFF;  Surgeon: Claude S. Williams IV, MD;  Location: [facility] OR;  Service: Orthopedics;  Laterality: Right;    SHOULDER ARTHROSCOPY Right 3/17/2023    Procedure: ARTHROSCOPY, SHOULDER;  Surgeon: Claude S. Williams IV, MD;  Location: [facility] OR;  Service: Orthopedics;  Laterality: Right;  1 HR    TRIGGER FINGER RELEASE Right 3/17/2023    Procedure: RELEASE, TRIGGER FINGER;  Surgeon: Claude S. Williams IV, MD;  Location: [facility] OR;  Service: Orthopedics;  Laterality: Right;       Review of patient's allergies indicates:  No Known Allergies    Family History    None       Tobacco Use    Smoking status: Former     Current packs/day: 0.00     Average packs/day: 2.0 packs/day for 15.0 years (30.0 ttl pk-yrs)     Types: Cigarettes     Start date: 1965     Quit date: 1980     Years since quittin.8    Smokeless tobacco: Never   Substance and Sexual Activity    Alcohol use: Not Currently     Alcohol/week: 2.0 standard drinks of alcohol     Types: 2 Cans of beer per week    Drug use: Never    Sexual activity: Not on file         Review of Systems   Unable to perform ROS: Intubated     Objective:     Vital Signs (Most Recent):  Temp: 97.1 °F (36.2 °C) (24 08)  Pulse: (!) 49 (24)  Resp: 16 (24)  BP: 119/61 (24)  SpO2: 98 % (24) Vital Signs (24h Range):  Temp:  [97.1 °F (36.2 °C)-97.6 °F (36.4 °C)] 97.1 °F (36.2 °C)  Pulse:  [48-63] 49  Resp:  [16-18] 16  SpO2:  [90 %-98 %] 98 %  BP: (119-133)/(58-70) 119/61        Body mass index is 31.38 kg/m².      Intake/Output Summary (Last 24 hours) at 2024 1012  Last  "data filed at 11/1/2024 0943  Gross per 24 hour   Intake 575 ml   Output --   Net 575 ml        Physical Exam  Vitals and nursing note reviewed.   Eyes:      Comments: Taped shut for procedure   Cardiovascular:      Rate and Rhythm: Regular rhythm. Bradycardia present.   Pulmonary:      Comments: Paralyzed, sedated, mechanical breath sounds    For bronchoscopic evaluation, see procedure note.  Musculoskeletal:      Comments: RLE with surgical bandage and  brace   Skin:     General: Skin is warm and dry.   Neurological:      Comments: Intubated, sedated, paralyzed          Vents:       Lines/Drains/Airways       Airway  Duration                  Airway - Non-Surgical 11/01/24 0744 <1 day         Airway - Non-Surgical 11/01/24 0744 LMA <1 day              Peripheral Intravenous Line  Duration                  Peripheral IV - Single Lumen 11/01/24 0645 20 G Hand <1 day                    Significant Labs:    CBC/Anemia Profile:  No results for input(s): "WBC", "HGB", "HCT", "PLT", "MCV", "RDW", "IRON", "FERRITIN", "RETIC", "FOLATE", "[id number]", "OCCULTBLOOD" in the last 48 hours.     Chemistries:  No results for input(s): "NA", "K", "CL", "CO2", "BUN", "CREATININE", "CALCIUM", "ALBUMIN", "PROT", "BILITOT", "ALKPHOS", "ALT", "AST", "GLUCOSE", "MG", "PHOS" in the last 48 hours.    All pertinent labs within the past 24 hours have been reviewed.    Significant Imaging:   I have reviewed all pertinent imaging results/findings within the past 24 hours.  No chest imaging  Assessment/Plan:     Pulmonary  Hemoptysis  Mr. Sharma had an acute episode of bright red hemoptysis that appeared to be self-limited. On bronchoscopic evaluation, there was some bright red blood and clots noted in both sides but seemed limited to the larger airways. After cleaning out clots, there did not appear to be any further bleeding or filling of the airways. Brief survey showed all major segments open. We feel that this is most likely capillaritis " related to a hypertensive crisis while on DAPT. We stayed in the OR for extubation had only a brief look at the trachea that was bloodless as the tube was removed. Would anticipate some blood coming up for the next 24 hours but in decreasing amounts.   - likely self-limited capillaritis   - no further intervention needed  - keep Bps < 180  - DAPT per post-op instructions          Thank you for your consult. I will sign off. Please contact us if you have any additional questions.     Justin Ellerman, MD  Pulmonology  [facility] - [facility] ([place])

## 2024-11-04 VITALS
HEART RATE: 53 BPM | RESPIRATION RATE: 16 BRPM | DIASTOLIC BLOOD PRESSURE: 78 MMHG | OXYGEN SATURATION: 96 % | HEIGHT: 71 IN | SYSTOLIC BLOOD PRESSURE: 149 MMHG | BODY MASS INDEX: 31.38 KG/M2 | TEMPERATURE: 97 F

## (undated) DEVICE — DRESSING XEROFORM FOIL PK 1X8

## (undated) DEVICE — CORD BIPOLAR 12 FOOT

## (undated) DEVICE — SEE L#120831

## (undated) DEVICE — DRESSING XEROFORM NONADH 1X8IN

## (undated) DEVICE — TUBE SET INFLOW/OUTFLOW

## (undated) DEVICE — PAD ABDOMINAL STERILE 8X10IN

## (undated) DEVICE — APPLICATOR CHLORAPREP ORN 26ML

## (undated) DEVICE — UNDERGLOVES BIOGEL PI SIZE 8

## (undated) DEVICE — SUT 4/0 18IN ETHILON BL P3

## (undated) DEVICE — SOL IRR SOD CHL .9% POUR

## (undated) DEVICE — NDL 18GA X1 1/2 REG BEVEL

## (undated) DEVICE — DRAPE SURG W/TWL 17 5/8X23

## (undated) DEVICE — BANDAGE COBAN COLOR ASSORT 3X5

## (undated) DEVICE — SUT 4.0 ETHILON

## (undated) DEVICE — SUT ETHILON 3-0 PS2 18 BLK

## (undated) DEVICE — PACK UPPER EXTREMITY BAPTIST

## (undated) DEVICE — PROBE ARTHO ENERGY 90 DEG

## (undated) DEVICE — BUCKET PLASTER DISPOSABLE

## (undated) DEVICE — GLOVE BIOGEL SKINSENSE PI 7.5

## (undated) DEVICE — SUT PROLENE 1 CTX 30IN BLUE

## (undated) DEVICE — NDL HYPO REG 25G X 1 1/2

## (undated) DEVICE — DRAPE SHOULDER BEACH CHAIR

## (undated) DEVICE — TAPE SURG MEDIPORE 6X72IN

## (undated) DEVICE — DRAPE U SPLIT SHEET 54X76IN

## (undated) DEVICE — NDL SPECTRUM AUTOPASS

## (undated) DEVICE — Device

## (undated) DEVICE — SOL NACL IRR 3000ML

## (undated) DEVICE — WRAP COBAN NL STRL 4INX5YD

## (undated) DEVICE — SPLINT PLASTER FAST SET 5X30IN

## (undated) DEVICE — CORD FOR BIPOLAR FORCEPS 12

## (undated) DEVICE — TOURNIQUET SB QC DP 18X4IN

## (undated) DEVICE — GLOVE BIOGEL SKINSENSE PI 7.0

## (undated) DEVICE — DRAPE C-ARM MINI DISP

## (undated) DEVICE — PAD ABD 8X10 STERILE

## (undated) DEVICE — ALCOHOL ISOPROPYL BLU 70% 16OZ

## (undated) DEVICE — TUBING 4-LEAD ARTHOSCOPY

## (undated) DEVICE — DRAPE STERI INSTRUMENT 1018

## (undated) DEVICE — DRAPE ARTHSCP FLD CTRL POUCH

## (undated) DEVICE — BLADE GATOR 4.2

## (undated) DEVICE — BLADE SHAVER 4.5 6/BX

## (undated) DEVICE — SUT MCRYL PLUS 4-0 PS2 27IN

## (undated) DEVICE — SYR 10CC LUER LOCK

## (undated) DEVICE — BLADE SHAVER LANZA 4.2X13CM

## (undated) DEVICE — CANNULA PASSPORT 8 MM X 4CM.

## (undated) DEVICE — BLADE SCALP OPHTL RND TIP

## (undated) DEVICE — PAD UNDERPAD 30X30

## (undated) DEVICE — SPONGE COTTON TRAY 4X4IN

## (undated) DEVICE — DRESSING XEROFORM 1X8IN

## (undated) DEVICE — SUT VICRYL 3-0 27 SH

## (undated) DEVICE — PADDING CAST SOFT-ROLL 4 X 4

## (undated) DEVICE — BANDAGE ESMARK ELASTIC ST 6X9

## (undated) DEVICE — PAD COLD THERAPY KNEE WRAP ON

## (undated) DEVICE — DRAPE STERI U-SHAPED 47X51IN

## (undated) DEVICE — ELECTRODE REM PLYHSV RETURN 9

## (undated) DEVICE — SUT MONOCRYL PLUS 4-0 P3

## (undated) DEVICE — BLADE SURG STAINLESS STEEL #15

## (undated) DEVICE — PAD SHOULDER CARE POLAR

## (undated) DEVICE — UNDERGLOVE BIOGEL PI SZ 6.5 LF

## (undated) DEVICE — CUFF TOURNIQUET DL PRT

## (undated) DEVICE — BANDAGE MATRIX HK LOOP 6IN 5YD

## (undated) DEVICE — GLOVE BIOGEL SKINSENSE PI 6.5

## (undated) DEVICE — SYR B-D DISP CONTROL 10CC100/C

## (undated) DEVICE — SOL ALCOHOL ISO 70% WNTGR 16OZ

## (undated) DEVICE — PROBE ARTHSCP EDGE  90 SUCTION

## (undated) DEVICE — SUPPORT SLING SHOT II MEDIUM

## (undated) DEVICE — PAD CAST SPECIALIST STRL 4

## (undated) DEVICE — SPONGE GAUZE 16PLY 4X4

## (undated) DEVICE — KIT TRIMANO